# Patient Record
Sex: FEMALE | Race: WHITE | NOT HISPANIC OR LATINO | Employment: OTHER | URBAN - METROPOLITAN AREA
[De-identification: names, ages, dates, MRNs, and addresses within clinical notes are randomized per-mention and may not be internally consistent; named-entity substitution may affect disease eponyms.]

---

## 2018-06-09 ENCOUNTER — APPOINTMENT (EMERGENCY)
Dept: CT IMAGING | Facility: HOSPITAL | Age: 73
End: 2018-06-09
Payer: MEDICARE

## 2018-06-09 ENCOUNTER — HOSPITAL ENCOUNTER (EMERGENCY)
Facility: HOSPITAL | Age: 73
Discharge: HOME | End: 2018-06-09
Attending: EMERGENCY MEDICINE | Admitting: EMERGENCY MEDICINE
Payer: MEDICARE

## 2018-06-09 VITALS
HEART RATE: 72 BPM | SYSTOLIC BLOOD PRESSURE: 132 MMHG | WEIGHT: 167 LBS | OXYGEN SATURATION: 99 % | DIASTOLIC BLOOD PRESSURE: 70 MMHG | RESPIRATION RATE: 20 BRPM | HEIGHT: 66 IN | TEMPERATURE: 98.2 F | BODY MASS INDEX: 26.84 KG/M2

## 2018-06-09 DIAGNOSIS — R16.0 LIVER MASS: ICD-10-CM

## 2018-06-09 DIAGNOSIS — R91.8 LUNG MASS: Primary | ICD-10-CM

## 2018-06-09 LAB
ANION GAP BLD CALC-SCNC: 13 MMOL/L (ref 4–13)
BASE EXCESS BLDA CALC-SCNC: 5 MMOL/L (ref -2–3)
BUN BLD-MCNC: 20 MG/DL (ref 5–25)
CA-I BLD-SCNC: 1.17 MMOL/L (ref 1.12–1.32)
CHLORIDE BLD-SCNC: 106 MMOL/L (ref 100–108)
CREAT BLD-MCNC: 0.7 MG/DL (ref 0.6–1.3)
GFR SERPL CREATININE-BSD FRML MDRD: 85 ML/MIN/1.73SQ M
GLUCOSE SERPL-MCNC: 87 MG/DL (ref 65–140)
HCO3 BLDA-SCNC: 28.4 MMOL/L (ref 24–30)
HCT VFR BLD CALC: 40 % (ref 34.8–46.1)
HGB BLDA-MCNC: 13.6 G/DL (ref 11.5–15.4)
PCO2 BLD: 28 MMOL/L (ref 21–32)
PCO2 BLD: 30 MMOL/L (ref 21–32)
PCO2 BLD: 38.3 MM HG (ref 42–50)
PH BLD: 7.48 [PH] (ref 7.3–7.4)
PO2 BLD: 27 MM HG (ref 35–45)
POTASSIUM BLD-SCNC: 4.3 MMOL/L (ref 3.5–5.3)
SAO2 % BLD FROM PO2: 56 % (ref 95–98)
SODIUM BLD-SCNC: 142 MMOL/L (ref 136–145)
SPECIMEN SOURCE: ABNORMAL
SPECIMEN SOURCE: NORMAL

## 2018-06-09 PROCEDURE — 74177 CT ABD & PELVIS W/CONTRAST: CPT

## 2018-06-09 PROCEDURE — 99284 EMERGENCY DEPT VISIT MOD MDM: CPT

## 2018-06-09 PROCEDURE — 80047 BASIC METABLC PNL IONIZED CA: CPT

## 2018-06-09 PROCEDURE — 71260 CT THORAX DX C+: CPT

## 2018-06-09 PROCEDURE — 82803 BLOOD GASES ANY COMBINATION: CPT

## 2018-06-09 PROCEDURE — 85014 HEMATOCRIT: CPT

## 2018-06-09 RX ORDER — OLMESARTAN MEDOXOMIL 20 MG/1
20 TABLET ORAL DAILY
COMMUNITY

## 2018-06-09 RX ORDER — ACETAMINOPHEN 325 MG/1
650 TABLET ORAL ONCE
Status: COMPLETED | OUTPATIENT
Start: 2018-06-09 | End: 2018-06-09

## 2018-06-09 RX ORDER — PRAVASTATIN SODIUM 20 MG
20 TABLET ORAL DAILY
COMMUNITY

## 2018-06-09 RX ADMIN — ACETAMINOPHEN 650 MG: 325 TABLET, FILM COATED ORAL at 13:16

## 2018-06-09 RX ADMIN — IOHEXOL 100 ML: 350 INJECTION, SOLUTION INTRAVENOUS at 14:39

## 2018-06-09 NOTE — ED NOTES
All results reviewed with patient by MD  Pt to follow up with PCP regarding CT Findings       Savannah Kanner, RN  06/09/18 1219

## 2018-06-09 NOTE — ED PROVIDER NOTES
History  Chief Complaint   Patient presents with    Rib Injury     To ED with c/o pain in left rib and right shoulder after being tripped by her daughters dog  fell onto hardwood  Denies any LOC, head, neck or back injury  History provided by:  Patient   used: No        Patient is a 75-year-old female presenting to emergency department with left-sided thoracic rib pain  Nita Mendoza yesterday after dog ran into her  No head trauma  No loss of consciousness  No neck or back pain  No nausea or vomiting  No shortness of breath  No chest pain  No weakness numbness or tingling  MDM  CT chest abdomen pelvis to evaluate for rib fracture and spleen injury    Patient is not from the area  Prefers to follow up with oncologist at home  Patient will return tomorrow to  Cd with pictures  I spoke with radiology tech, they will make Cd available for her  Prior to Admission Medications   Prescriptions Last Dose Informant Patient Reported? Taking? olmesartan (BENICAR) 20 mg tablet  Self Yes Yes   Sig: Take 20 mg by mouth daily   pravastatin (PRAVACHOL) 20 mg tablet  Self Yes Yes   Sig: Take 20 mg by mouth daily      Facility-Administered Medications: None       Past Medical History:   Diagnosis Date    Hyperlipidemia     Hypertension        Past Surgical History:   Procedure Laterality Date    HYSTERECTOMY         History reviewed  No pertinent family history  I have reviewed and agree with the history as documented  Social History   Substance Use Topics    Smoking status: Never Smoker    Smokeless tobacco: Never Used    Alcohol use Yes      Comment: Social        Review of Systems   Constitutional: Negative for chills, diaphoresis and fever  HENT: Negative for congestion and sore throat  Respiratory: Negative for cough, shortness of breath, wheezing and stridor  Cardiovascular: Negative for chest pain, palpitations and leg swelling     Gastrointestinal: Positive for abdominal pain  Negative for blood in stool, diarrhea, nausea and vomiting  Genitourinary: Negative for dysuria, frequency and urgency  Musculoskeletal: Negative for neck pain and neck stiffness  Skin: Negative for pallor and rash  Neurological: Negative for dizziness, syncope, weakness, light-headedness and headaches  All other systems reviewed and are negative  Physical Exam  Physical Exam   Constitutional: She is oriented to person, place, and time  She appears well-developed and well-nourished  HENT:   Head: Normocephalic and atraumatic  Eyes: Pupils are equal, round, and reactive to light  Neck: Neck supple  Cardiovascular: Normal rate, regular rhythm, normal heart sounds and intact distal pulses  Pulmonary/Chest: Effort normal and breath sounds normal  No respiratory distress  Abdominal: Soft  Bowel sounds are normal  There is tenderness  Left upper quadrant abdominal tenderness   Musculoskeletal: Normal range of motion  She exhibits no edema or tenderness  Left thoracic rib tenderness   Neurological: She is alert and oriented to person, place, and time  Skin: Skin is warm and dry  Capillary refill takes less than 2 seconds  Vitals reviewed        Vital Signs  ED Triage Vitals [06/09/18 1256]   Temperature Pulse Respirations Blood Pressure SpO2   98 2 °F (36 8 °C) 80 20 157/69 100 %      Temp Source Heart Rate Source Patient Position - Orthostatic VS BP Location FiO2 (%)   Tympanic Monitor Sitting Right arm --      Pain Score       5           Vitals:    06/09/18 1256 06/09/18 1500   BP: 157/69 132/70   Pulse: 80 72   Patient Position - Orthostatic VS: Sitting Sitting       Visual Acuity  Visual Acuity      Most Recent Value   L Pupil Size (mm)  4   R Pupil Size (mm)  4          ED Medications  Medications   acetaminophen (TYLENOL) tablet 650 mg (650 mg Oral Given 6/9/18 1316)   iohexol (OMNIPAQUE) 350 MG/ML injection (MULTI-DOSE) 100 mL (100 mL Intravenous Given 6/9/18 1439) Diagnostic Studies  Results Reviewed     Procedure Component Value Units Date/Time    POCT Chem 8+ [[id number]] Collected:  06/09/18 1340    Lab Status:  Final result Updated:  06/09/18 1345     SODIUM, I-STAT 142 mmol/l      Potassium, i-STAT 4 3 mmol/L      Chloride, istat 106 mmol/L      CO2, i-STAT 28 mmol/L      Anion Gap, Istat 13 mmol/L      Calcium, Ionized i-STAT 1 17 mmol/L      BUN, I-STAT 20 mg/dl      Creatinine, i-STAT 0 7 mg/dl      eGFR 85 ml/min/1 73sq m      Glucose, i-STAT 87 mg/dl      Hct, i-STAT 40 %      Hgb, i-STAT 13 6 g/dl      Specimen Type VENOUS    POCT Blood Gas (G3+) [[id number]]  (Abnormal) Collected:  06/09/18 1327    Lab Status:  Final result Updated:  06/09/18 1332     ph, Osiel ISTAT 7 478 (HH)     pCO2, Osiel i-STAT 38 3 (L) mm HG      pO2, Osiel i-STAT 27 0 (L) mm HG      BE, i-STAT 5 (H) mmol/L      HCO3, Osiel i-STAT 28 4 mmol/L      CO2, i-STAT 30 mmol/L      O2 Sat, i-STAT 56 (L) %      Specimen Type VENOUS                 CT chest abdomen pelvis w contrast   Final Result by Kirstie Marks DO (06/09 1601)      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  Large lung mass within the posterior medial left lower lobe suspicious for malignancy  Septated hypodense mass within the posterior segment of the left lobe of the liver measuring approximately 3 3 cm may represent complex cyst   However, given the above described lung mass, hepatic metastasis not excluded        I personally discussed this study with BRE ARGUELLO on 6/9/2018 at 4:00 PM                         Workstation performed: [id number]                    Procedures  Procedures       Phone Contacts  ED Phone Contact    ED Course                               MDM  CritCare Time    Disposition  Final diagnoses:   Lung mass   Liver mass     Time reflects when diagnosis was documented in both MDM as applicable and the Disposition within this note     Time User Action Codes Description Comment 6/9/2018  4:18 PM Tadevosmichelle, Rebeca Add [R91 8] Lung mass     6/9/2018  4:18 PM TadevosRebeca martinez Add [R16 0] Liver mass       ED Disposition     ED Disposition Condition Comment    Discharge  Malgorzata Merino discharge to home/self care  Condition at discharge: Good        Follow-up Information     Follow up With Specialties Details Why Contact Info Additional Information    Lilli Holden MD  In 2 days  please follow-up with when you get home [address] [place] [id number] [phone]       [facility] Emergency Department Emergency Medicine  As needed, If symptoms worsen [address]  [address] [facility] [address], [address], [place], [place], [address] [facility]   please follow-up next week if you decide to stay in area [address]  [phone]           Discharge Medication List as of 6/9/2018  4:19 PM      CONTINUE these medications which have NOT CHANGED    Details   olmesartan (BENICAR) 20 mg tablet Take 20 mg by mouth daily, Historical Med      pravastatin (PRAVACHOL) 20 mg tablet Take 20 mg by mouth daily, Historical Med           No discharge procedures on file      ED Provider  Electronically Signed by           Terrie Yates MD  06/15/18 2012

## 2018-06-09 NOTE — DISCHARGE INSTRUCTIONS
Lung Cancer   WHAT YOU NEED TO KNOW:   What is lung cancer? Lung cancer usually starts in the cells that line the inside of the lungs  The 2 basic types of lung cancer are non-small cell lung cancer and small cell lung cancer  What increases my risk for lung cancer? · Cigarette smoking, or breathing secondhand smoke    · Air pollution, such as diesel exhaust fumes    · Exposure to radon gas    · A family history of lung cancer    · Past lung diseases that caused scarring in the lungs, such as tuberculosis (TB)    · Working with chemicals, such as asbestos, uranium, arsenic, chromium, nickel, iron, or radioactive material  What are the signs and symptoms of lung cancer? · Chest pain that is not just in one area of your chest    · A cough that will not go away, and gets worse over time    · Coughing up lots of sputum, which may be bloody    · Frequent colds or other respiratory infections, such as pneumonia or bronchitis    · Wheezing or trouble breathing    · Hoarseness or difficulty swallowing    · Feeling more tired and weak than usual    · Loss of appetite or weight loss without trying    · Face or neck swelling  How is lung cancer diagnosed? · Blood tests  may show an infection  · An x-ray, CT, or MRI  may show the size and location of the cancer, or signs of infection  You may be given contrast liquid to help your lungs show up better  Tell the healthcare provider if you have ever had an allergic reaction to contrast liquid  Do not enter the MRI room with anything metal  Metal can cause serious injury  Tell the healthcare provider if you have any metal in or on your body  · A bronchoscopy  is a test to look inside your airway and lungs  Healthcare providers insert a bronchoscope (tube with a light and magnifying glass on the end) into your mouth and down into your lungs  · A biopsy  is a sample of lung tissue usually collected during a bronchoscopy   The sample will be sent to a lab and checked for abnormal cells  How is lung cancer treated? · Surgery  may be done on tumors that are small and have not spread to other parts of the body  If the tumor cannot be completely removed, surgery may be used to treat complications or to decrease your symptoms  · Radiation therapy  uses x-rays or gamma rays to treat cancer  Radiation kills cancer cells and may stop the cancer from spreading  · Medicine  is used to kill cancer cells  It may also be used to shrink lymph nodes that have cancer in them  What can I do to manage my lung cancer? · Do not smoke  Nicotine and other chemicals in cigarettes and cigars can cause lung damage  Ask your healthcare provider for information if you currently smoke and need help to quit  E-cigarettes or smokeless tobacco still contain nicotine  Talk to your healthcare provider before you use these products  · Drink liquids as directed  Ask how much liquid to drink each day and which liquids are best for you  Drink extra liquids to prevent dehydration  You will need to drink extra liquids if you are vomiting or have diarrhea from cancer treatments  · Return to activities slowly  Do more as you feel stronger  You may have trouble breathing when you are lying down  Use foam wedges or elevate the head of your bed  This may help you breathe easier while you are resting or sleeping  Use a device that will tilt your whole body, or bend your body at the waist  The device should not bend your body at the upper back or neck  · Exercise as directed  Exercise can help increase your energy level  · Eat healthy foods  Healthy foods include fruits, vegetables, whole-grain breads, low-fat dairy products, beans, lean meats, and fish  It may be easier for you to eat several small meals a day rather than a few large meals  · Limit or do not drink alcohol as directed  Alcohol can make breathing problems worse   Ask your healthcare provider if alcohol is safe for you to drink  You will need to limit the amount you drink if it is safe for you  Men should limit alcohol to 2 drinks per day  Women should limit alcohol to 1 drink per day  A drink of alcohol is 12 ounces of beer, 5 ounces of wine, or 1½ ounces of liquor  Call 911 for any of the following:   · Your arm or leg feels warm, tender, and painful  It may look swollen and red  · You have chest pain when you take a deep breath or cough  · You suddenly feel lightheaded or are short of breath  · You cough up blood  When should I seek immediate care? · You cannot think clearly  · Your lips or nails look blue or pale  When should I contact my healthcare provider? · You have a fever  · You have blood in your mucus or spit  · You are vomiting and cannot keep food or liquids down  · You have questions or concerns about your condition or care  CARE AGREEMENT:   You have the right to help plan your care  Learn about your health condition and how it may be treated  Discuss treatment options with your caregivers to decide what care you want to receive  You always have the right to refuse treatment  The above information is an  only  It is not intended as medical advice for individual conditions or treatments  Talk to your doctor, nurse or pharmacist before following any medical regimen to see if it is safe and effective for you  © 2017 [address] [employer] Information is for End User's use only and may not be sold, redistributed or otherwise used for commercial purposes  All illustrations and images included in CareNotes® are the copyrighted property of [employer]  or [employer]

## 2021-05-05 ENCOUNTER — HOSPITAL ENCOUNTER (OUTPATIENT)
Dept: GENERAL RADIOLOGY | Age: 76
Discharge: HOME | End: 2021-05-05
Attending: NURSE PRACTITIONER
Payer: MEDICARE

## 2021-05-05 ENCOUNTER — TRANSCRIBE ORDER (OUTPATIENT)
Dept: GENERAL RADIOLOGY | Age: 76
End: 2021-05-05

## 2021-05-05 DIAGNOSIS — M47.814 THORACIC SPONDYLOSIS WITHOUT MYELOPATHY: ICD-10-CM

## 2021-05-05 DIAGNOSIS — M47.814 THORACIC SPONDYLOSIS WITHOUT MYELOPATHY: Primary | ICD-10-CM

## 2021-05-05 PROCEDURE — 72072 X-RAY EXAM THORAC SPINE 3VWS: CPT

## 2022-09-16 ENCOUNTER — TRANSCRIBE ORDER (OUTPATIENT)
Dept: SCHEDULING | Age: 77
End: 2022-09-16

## 2022-09-16 DIAGNOSIS — R10.9 STOMACH ACHE: Primary | ICD-10-CM

## 2022-09-16 DIAGNOSIS — Q89.3 SITUS INVERSUS: ICD-10-CM

## 2023-12-29 ENCOUNTER — TRANSCRIBE ORDERS (OUTPATIENT)
Facility: HOSPITAL | Age: 78
End: 2023-12-29

## 2023-12-29 DIAGNOSIS — M79.89 SWELLING OF LIMB: Primary | ICD-10-CM

## 2023-12-30 ENCOUNTER — HOSPITAL ENCOUNTER (OUTPATIENT)
Dept: VASCULAR SURGERY | Facility: HOSPITAL | Age: 78
End: 2023-12-30
Attending: FAMILY MEDICINE
Payer: MEDICARE

## 2023-12-30 DIAGNOSIS — M79.89 SWELLING OF LIMB: ICD-10-CM

## 2023-12-30 PROCEDURE — 93970 EXTREMITY STUDY: CPT

## 2024-11-05 ENCOUNTER — TELEPHONE (OUTPATIENT)
Age: 79
End: 2024-11-05

## 2024-11-05 NOTE — TELEPHONE ENCOUNTER
Referral received from PCP Ashley Mcguire to be seen for Short term memory loss. I left a message to call back if she'd like to schedule.

## 2024-12-18 ENCOUNTER — OFFICE VISIT (OUTPATIENT)
Age: 79
End: 2024-12-18
Payer: MEDICARE

## 2024-12-18 VITALS
TEMPERATURE: 98.2 F | OXYGEN SATURATION: 91 % | DIASTOLIC BLOOD PRESSURE: 71 MMHG | HEART RATE: 95 BPM | SYSTOLIC BLOOD PRESSURE: 146 MMHG | WEIGHT: 144 LBS | RESPIRATION RATE: 16 BRPM

## 2024-12-18 DIAGNOSIS — R41.0 CONFUSION: ICD-10-CM

## 2024-12-18 DIAGNOSIS — R41.3 MEMORY DIFFICULTIES: Primary | ICD-10-CM

## 2024-12-18 PROBLEM — H90.A21 SENSORINEURAL HEARING LOSS (SNHL) OF RIGHT EAR WITH RESTRICTED HEARING OF LEFT EAR: Status: RESOLVED | Noted: 2017-03-23 | Resolved: 2024-12-18

## 2024-12-18 PROBLEM — U07.1 COVID-19: Status: RESOLVED | Noted: 2023-11-29 | Resolved: 2024-12-18

## 2024-12-18 PROBLEM — R06.09 DYSPNEA ON EXERTION: Status: RESOLVED | Noted: 2022-09-02 | Resolved: 2024-12-18

## 2024-12-18 PROBLEM — E53.8 VITAMIN B 12 DEFICIENCY: Status: RESOLVED | Noted: 2017-03-22 | Resolved: 2024-12-18

## 2024-12-18 PROBLEM — Z80.41 FAMILY HISTORY OF OVARIAN CANCER: Status: RESOLVED | Noted: 2017-07-06 | Resolved: 2024-12-18

## 2024-12-18 PROBLEM — R53.1 ASTHENIA: Status: RESOLVED | Noted: 2022-05-16 | Resolved: 2024-12-18

## 2024-12-18 PROBLEM — N18.31 CHRONIC KIDNEY DISEASE, STAGE 3A (HCC): Status: RESOLVED | Noted: 2024-12-18 | Resolved: 2024-12-18

## 2024-12-18 PROBLEM — K21.9 GERD WITHOUT ESOPHAGITIS: Status: RESOLVED | Noted: 2017-04-18 | Resolved: 2024-12-18

## 2024-12-18 PROBLEM — I10 ESSENTIAL (PRIMARY) HYPERTENSION: Status: RESOLVED | Noted: 2024-12-18 | Resolved: 2024-12-18

## 2024-12-18 PROBLEM — C83.33 DIFFUSE LARGE B-CELL LYMPHOMA OF INTRA-ABDOMINAL LYMPH NODES (HCC): Status: RESOLVED | Noted: 2022-08-15 | Resolved: 2024-12-18

## 2024-12-18 PROBLEM — Z96.642 HISTORY OF LEFT HIP REPLACEMENT: Status: RESOLVED | Noted: 2020-07-27 | Resolved: 2024-12-18

## 2024-12-18 PROBLEM — S22.000A COMPRESSION FRACTURE OF THORACIC VERTEBRA (HCC): Status: RESOLVED | Noted: 2020-10-13 | Resolved: 2024-12-18

## 2024-12-18 PROBLEM — K57.92 DIVERTICULITIS: Status: RESOLVED | Noted: 2019-07-24 | Resolved: 2024-12-18

## 2024-12-18 PROBLEM — Z86.19 HISTORY OF SEPSIS: Status: RESOLVED | Noted: 2020-07-21 | Resolved: 2024-12-18

## 2024-12-18 PROBLEM — I27.20 PULMONARY HYPERTENSION (HCC): Status: RESOLVED | Noted: 2021-06-10 | Resolved: 2024-12-18

## 2024-12-18 PROBLEM — R00.2 PALPITATIONS: Status: RESOLVED | Noted: 2022-11-15 | Resolved: 2024-12-18

## 2024-12-18 PROBLEM — D64.9 ANEMIA: Status: RESOLVED | Noted: 2021-12-30 | Resolved: 2024-12-18

## 2024-12-18 PROBLEM — D50.1 IRON DEFICIENCY ANEMIA DUE TO SIDEROPENIC DYSPHAGIA: Status: RESOLVED | Noted: 2017-03-22 | Resolved: 2024-12-18

## 2024-12-18 PROBLEM — G89.4 CHRONIC PAIN DISORDER: Status: RESOLVED | Noted: 2021-01-28 | Resolved: 2024-12-18

## 2024-12-18 PROBLEM — G62.9 NEUROPATHY: Status: RESOLVED | Noted: 2024-12-18 | Resolved: 2024-12-18

## 2024-12-18 PROBLEM — R00.0 SINUS TACHYCARDIA: Status: RESOLVED | Noted: 2022-11-15 | Resolved: 2024-12-18

## 2024-12-18 PROBLEM — C54.1 MALIGNANT NEOPLASM OF ENDOMETRIUM OF CORPUS UTERI (HCC): Status: RESOLVED | Noted: 2024-12-18 | Resolved: 2024-12-18

## 2024-12-18 PROBLEM — I26.99 PULMONARY EMBOLISM (HCC): Status: RESOLVED | Noted: 2024-12-18 | Resolved: 2024-12-18

## 2024-12-18 PROBLEM — D69.6 THROMBOCYTOPENIA (HCC): Status: RESOLVED | Noted: 2022-01-08 | Resolved: 2024-12-18

## 2024-12-18 PROBLEM — S22.49XA FRACTURE OF MULTIPLE RIBS: Status: RESOLVED | Noted: 2021-02-04 | Resolved: 2024-12-18

## 2024-12-18 PROBLEM — K57.90 DIVERTICULOSIS: Status: RESOLVED | Noted: 2019-07-24 | Resolved: 2024-12-18

## 2024-12-18 PROBLEM — Z87.440 HISTORY OF RECURRENT UTIS: Status: RESOLVED | Noted: 2017-04-18 | Resolved: 2024-12-18

## 2024-12-18 PROBLEM — I95.9 HYPOTENSIVE EPISODE: Status: RESOLVED | Noted: 2022-05-16 | Resolved: 2024-12-18

## 2024-12-18 PROBLEM — M47.814 THORACIC SPONDYLOSIS WITHOUT MYELOPATHY: Status: RESOLVED | Noted: 2021-01-28 | Resolved: 2024-12-18

## 2024-12-18 PROCEDURE — 99204 OFFICE O/P NEW MOD 45 MIN: CPT | Performed by: PSYCHIATRY & NEUROLOGY

## 2024-12-18 PROCEDURE — G8484 FLU IMMUNIZE NO ADMIN: HCPCS | Performed by: PSYCHIATRY & NEUROLOGY

## 2024-12-18 PROCEDURE — G8399 PT W/DXA RESULTS DOCUMENT: HCPCS | Performed by: PSYCHIATRY & NEUROLOGY

## 2024-12-18 PROCEDURE — 1159F MED LIST DOCD IN RCRD: CPT | Performed by: PSYCHIATRY & NEUROLOGY

## 2024-12-18 PROCEDURE — G8427 DOCREV CUR MEDS BY ELIG CLIN: HCPCS | Performed by: PSYCHIATRY & NEUROLOGY

## 2024-12-18 PROCEDURE — 1090F PRES/ABSN URINE INCON ASSESS: CPT | Performed by: PSYCHIATRY & NEUROLOGY

## 2024-12-18 PROCEDURE — 1123F ACP DISCUSS/DSCN MKR DOCD: CPT | Performed by: PSYCHIATRY & NEUROLOGY

## 2024-12-18 PROCEDURE — 96138 PSYCL/NRPSYC TECH 1ST: CPT | Performed by: PSYCHIATRY & NEUROLOGY

## 2024-12-18 PROCEDURE — 1036F TOBACCO NON-USER: CPT | Performed by: PSYCHIATRY & NEUROLOGY

## 2024-12-18 PROCEDURE — G8421 BMI NOT CALCULATED: HCPCS | Performed by: PSYCHIATRY & NEUROLOGY

## 2024-12-18 RX ORDER — CARBOXYMETHYLCELLULOSE SODIUM 5 MG/ML
1 SOLUTION/ DROPS OPHTHALMIC
COMMUNITY

## 2024-12-18 RX ORDER — FLUTICASONE FUROATE, UMECLIDINIUM BROMIDE AND VILANTEROL TRIFENATATE 100; 62.5; 25 UG/1; UG/1; UG/1
1 POWDER RESPIRATORY (INHALATION) DAILY
COMMUNITY
Start: 2024-10-17

## 2024-12-18 RX ORDER — HYDROCORTISONE 10 MG/1
10 TABLET ORAL 2 TIMES DAILY
COMMUNITY

## 2024-12-18 RX ORDER — ALBUTEROL SULFATE 90 UG/1
2 INHALANT RESPIRATORY (INHALATION) EVERY 6 HOURS PRN
COMMUNITY
Start: 2024-12-10

## 2024-12-18 RX ORDER — METOPROLOL TARTRATE 50 MG
50 TABLET ORAL 2 TIMES DAILY
COMMUNITY

## 2024-12-18 RX ORDER — BRIMONIDINE TARTRATE 2 MG/ML
1 SOLUTION/ DROPS OPHTHALMIC 2 TIMES DAILY
COMMUNITY

## 2024-12-18 RX ORDER — RIVAROXABAN 20 MG/1
20 TABLET, FILM COATED ORAL
COMMUNITY

## 2024-12-18 RX ORDER — TRAVOPROST OPHTHALMIC SOLUTION 0.04 MG/ML
1 SOLUTION OPHTHALMIC NIGHTLY
COMMUNITY
End: 2024-12-18

## 2024-12-18 ASSESSMENT — PATIENT HEALTH QUESTIONNAIRE - PHQ9
1. LITTLE INTEREST OR PLEASURE IN DOING THINGS: NOT AT ALL
1. LITTLE INTEREST OR PLEASURE IN DOING THINGS: NOT AT ALL
SUM OF ALL RESPONSES TO PHQ9 QUESTIONS 1 & 2: 0
SUM OF ALL RESPONSES TO PHQ9 QUESTIONS 1 & 2: 0
SUM OF ALL RESPONSES TO PHQ QUESTIONS 1-9: 0
2. FEELING DOWN, DEPRESSED OR HOPELESS: NOT AT ALL
SUM OF ALL RESPONSES TO PHQ QUESTIONS 1-9: 0
SUM OF ALL RESPONSES TO PHQ QUESTIONS 1-9: 0
2. FEELING DOWN, DEPRESSED OR HOPELESS: NOT AT ALL
SUM OF ALL RESPONSES TO PHQ QUESTIONS 1-9: 0

## 2024-12-18 NOTE — PROGRESS NOTES
[address] (16 minute minimum)  20 minutes were spent administering cognitive testing by medical assistant/nurse.     Cognitive Testing Evaluation     Introduction:     Anastacia Oliver  1945  Female   This 79 year old Female was administered a battery of neurocognitive testing on 12/18/2024.     Tests Administered:     Trails A, Trails B, Digit Symbol Substitution, Stroop, Immediate Recognition, Delayed Recognition   The combined test administration time was not recorded   Test Results:   Cognitive testing was provided via a battery of cognitive assessments. The pattern of test scores indicate that results are valid.  A Clinical Report with further description of scores and results is also available.   Overall: Patient tested in the 1st* percentile (standard score of 0).   Trails A: Patient tested in the --* percentile (scaled standard score of null).  Trails B: Patient tested in the --* percentile (scaled standard score of null).  Digit Symbol Substitution: Patient tested in the 1st percentile (scaled standard score of -6).  Stroop: Patient tested in the 1st percentile (scaled standard score of -16).  Immediate Recognition: Patient tested in the 1st percentile (scaled standard score of -22).  Delayed Recognition: Patient tested in the 1st percentile (scaled standard score of 45).   * These assessments were not scored because they were potentially invalid, or the patient failed to complete in the allotted time.   Interpretation of Test Scores:     Examination of individual component tests shows:   Attention - Trails A: possible impairment  Mental Flexibility - Trails B: possible impairment  Executive Function - Digit Symbol Substitution: likely impairment  Executive Function - Stroop: likely impairment  Memory - Immediate Recognition: likely impairment  Memory - Delayed Recognition: likely impairment    The patient’s overall cognitive test performance was in the 1st percentile when compared to individuals of a

## 2024-12-18 NOTE — PATIENT INSTRUCTIONS
PRIOR AUTHORIZATION FOR MEDICATIONS    If you were prescribed medication during your visit, it may require a prior authorization which is a determination of the medication coverage made by your insurance plan.     This process can take 14 business days for most medications prescribed by our Neurologists.      Botox injections (for therapeutic use) can take up to 8 weeks.    If you haven't heard from our office or your pharmacy within the time outlined above, please contact our office.        [place] [place] [facility] Test Result Communication    Test results are available in [employer].  [employer] is the patient portal into our electronic health record.  This feature allows patients to see diagnostic test results, immunizations, allergies, past medical and surgical history, current medications, and send messages directly to providers.  Our team members at the  can provide additional information and assist with registration.  The [employer] support team can be reached at [phone].    In some cases, a provider might need time to explain the results in detail during a follow-up appointment.  This might include additional information or context that will help patients understand the reason for next steps in the plan of care recommended by their provider.    If a patient chooses to receive diagnostic testing at an imaging center outside of the [facility] network, it is the patient's responsibility to bring the imaging report and disc to their [facility] follow-up appointment.    If the test results reveal anything that is particularly noteworthy, we will contact you to discuss the matter and, if necessary, schedule a follow-up appointment at an earlier date.    If you have not received your test results by [employer] or other communication within 7 days, please contact our office.  An inquiry can be sent to your provider using [employer].  Alternatively, appointments can be scheduled via

## 2024-12-18 NOTE — PROGRESS NOTES
[facility] at [facility] at [address] [place] [address]  [address] [place] [address] [phone] Office  [phone] Facsimile           Referring: Ashley Mcguire MD  [address] [place] [address]     Chief Complaint   Patient presents with    New Patient     Presents with Page daughter today    Memory Loss     Periods of confusion a little over a year, including not recognizing spouse, her home, etc.  Becoming more consistent        History of Present Illness  The patient is an 79-year-old female presenting today for a neurologic consultation regarding worsening memory. She is accompanied by her daughter.    The patient reports experiencing increased difficulty in performing tasks that were previously manageable, indicative of a decline in cognitive function. She occasionally repeats questions or stories but does not forget conversations. She has been abstaining from driving for approximately 3 years without any associated issues. Her  manages their financial matters, a responsibility they previously shared. Her mood remains stable, with no feelings of depression. Her daughter has observed periods of confusion, which have become more prolonged and consistent over time. These episodes are characterized by disorientation, such as not recognizing her  or their home. She has not undergone any brain imaging or formal cognitive testing. She does not smoke or drink alcohol. Prior to custodial, she worked as an  and was known for her organizational skills. Currently, she shares household responsibilities with her  and has external help for cleaning. She does not snore.She does not smoke. She is not a big drinker. She worked as an . Her mother had dementia that started in her

## 2024-12-27 ENCOUNTER — TELEPHONE (OUTPATIENT)
Age: 79
End: 2024-12-27

## 2024-12-27 DIAGNOSIS — R41.3 MEMORY DIFFICULTIES: ICD-10-CM

## 2024-12-27 DIAGNOSIS — R41.0 CONFUSION: ICD-10-CM

## 2024-12-27 LAB
T4 FREE SERPL-MCNC: 1.9 NG/DL (ref 0.8–1.5)
TSH SERPL DL<=0.05 MIU/L-ACNC: 0.53 UIU/ML (ref 0.36–3.74)
VIT B12 SERPL-MCNC: 925 PG/ML (ref 193–986)

## 2024-12-27 NOTE — TELEPHONE ENCOUNTER
Patient's daughter is requesting a call to schedule a f/u appt with .    Would also like to speak to the nurse regarding pt's condition as it is worsening. States she has started to wander off and is concerned.    Please contact to discuss.

## 2024-12-27 NOTE — TELEPHONE ENCOUNTER
Pt just seen 12/18/24 as a new pt for memory- has upcoming appt's for dop, eeg, MRI, neurocog, ect  Per daughter, pt is having worsening night time sundowning sx's. Wandering, hallucinations \"thought  was a stranger\". Pt lives w/ .   Would like to know if there is anything we would be willing to rx for pt at this point to help at night especially.  Recommend acute eval for UTI or other infection- daughter states they saw PCP within the last week and ruled out UTI. States PCP has \"passed the buck\" to neurology as far as prescribing anything for her mom.   Advised daughter that Dr Marte is out of the office. I can message the on call provider, but daughter does understand  testing may need to be completed first.  States she did not get a purple memory packet of resources at her visit- one was placed up front and she will come pick that up.

## 2024-12-28 ENCOUNTER — APPOINTMENT (OUTPATIENT)
Facility: HOSPITAL | Age: 79
DRG: 689 | End: 2024-12-28
Payer: MEDICARE

## 2024-12-28 ENCOUNTER — HOSPITAL ENCOUNTER (INPATIENT)
Facility: HOSPITAL | Age: 79
LOS: 3 days | Discharge: HOME HEALTH | DRG: 689 | End: 2024-12-31
Attending: EMERGENCY MEDICINE | Admitting: INTERNAL MEDICINE
Payer: MEDICARE

## 2024-12-28 DIAGNOSIS — N30.00 ACUTE CYSTITIS WITHOUT HEMATURIA: ICD-10-CM

## 2024-12-28 DIAGNOSIS — G93.40 ACUTE ENCEPHALOPATHY: Primary | ICD-10-CM

## 2024-12-28 DIAGNOSIS — I63.9 CEREBROVASCULAR ACCIDENT (CVA), UNSPECIFIED MECHANISM (HCC): ICD-10-CM

## 2024-12-28 PROBLEM — R41.82 AMS (ALTERED MENTAL STATUS): Status: ACTIVE | Noted: 2024-12-28

## 2024-12-28 LAB
ALBUMIN SERPL-MCNC: 3 G/DL (ref 3.5–5)
ALBUMIN/GLOB SERPL: 0.7 (ref 1.1–2.2)
ALP SERPL-CCNC: 66 U/L (ref 45–117)
ALT SERPL-CCNC: 14 U/L (ref 12–78)
AMPHET UR QL SCN: NEGATIVE
ANION GAP SERPL CALC-SCNC: 4 MMOL/L (ref 2–12)
APPEARANCE UR: CLEAR
AST SERPL-CCNC: 24 U/L (ref 15–37)
BACTERIA URNS QL MICRO: NEGATIVE /HPF
BARBITURATES UR QL SCN: NEGATIVE
BASOPHILS # BLD: 0 K/UL (ref 0–0.1)
BASOPHILS NFR BLD: 0 % (ref 0–1)
BENZODIAZ UR QL: NEGATIVE
BILIRUB SERPL-MCNC: 1 MG/DL (ref 0.2–1)
BILIRUB UR QL: NEGATIVE
BUN SERPL-MCNC: 13 MG/DL (ref 6–20)
BUN/CREAT SERPL: 14 (ref 12–20)
CALCIUM SERPL-MCNC: 8.6 MG/DL (ref 8.5–10.1)
CANNABINOIDS UR QL SCN: NEGATIVE
CHLORIDE SERPL-SCNC: 108 MMOL/L (ref 97–108)
CO2 SERPL-SCNC: 27 MMOL/L (ref 21–32)
COCAINE UR QL SCN: NEGATIVE
COLOR UR: ABNORMAL
COMMENT:: NORMAL
CREAT SERPL-MCNC: 0.93 MG/DL (ref 0.55–1.02)
DIFFERENTIAL METHOD BLD: ABNORMAL
EOSINOPHIL # BLD: 0 K/UL (ref 0–0.4)
EOSINOPHIL NFR BLD: 0 % (ref 0–7)
EPITH CASTS URNS QL MICRO: ABNORMAL /LPF
ERYTHROCYTE [DISTWIDTH] IN BLOOD BY AUTOMATED COUNT: 13.3 % (ref 11.5–14.5)
ETHANOL SERPL-MCNC: <10 MG/DL (ref 0–0.08)
FOLATE SERPL-MCNC: 10.5 NG/ML (ref 5–21)
GLOBULIN SER CALC-MCNC: 4.1 G/DL (ref 2–4)
GLUCOSE BLD STRIP.AUTO-MCNC: 91 MG/DL (ref 65–117)
GLUCOSE SERPL-MCNC: 109 MG/DL (ref 65–100)
GLUCOSE UR STRIP.AUTO-MCNC: NEGATIVE MG/DL
HCT VFR BLD AUTO: 44.2 % (ref 35–47)
HGB BLD-MCNC: 14.2 G/DL (ref 11.5–16)
HGB UR QL STRIP: NEGATIVE
IMM GRANULOCYTES # BLD AUTO: 0.1 K/UL (ref 0–0.04)
IMM GRANULOCYTES NFR BLD AUTO: 1 % (ref 0–0.5)
KETONES UR QL STRIP.AUTO: 40 MG/DL
LEUKOCYTE ESTERASE UR QL STRIP.AUTO: ABNORMAL
LYMPHOCYTES # BLD: 0.9 K/UL (ref 0.8–3.5)
LYMPHOCYTES NFR BLD: 10 % (ref 12–49)
Lab: NORMAL
MAGNESIUM SERPL-MCNC: 1.9 MG/DL (ref 1.6–2.4)
MCH RBC QN AUTO: 28.2 PG (ref 26–34)
MCHC RBC AUTO-ENTMCNC: 32.1 G/DL (ref 30–36.5)
MCV RBC AUTO: 87.9 FL (ref 80–99)
METHADONE UR QL: NEGATIVE
MONOCYTES # BLD: 0.8 K/UL (ref 0–1)
MONOCYTES NFR BLD: 9 % (ref 5–13)
NEUTS SEG # BLD: 6.9 K/UL (ref 1.8–8)
NEUTS SEG NFR BLD: 80 % (ref 32–75)
NITRITE UR QL STRIP.AUTO: NEGATIVE
NRBC # BLD: 0 K/UL (ref 0–0.01)
NRBC BLD-RTO: 0 PER 100 WBC
OPIATES UR QL: NEGATIVE
PCP UR QL: NEGATIVE
PH UR STRIP: 6.5 (ref 5–8)
PLATELET # BLD AUTO: 277 K/UL (ref 150–400)
PMV BLD AUTO: 9.4 FL (ref 8.9–12.9)
POTASSIUM SERPL-SCNC: 3.4 MMOL/L (ref 3.5–5.1)
PROT SERPL-MCNC: 7.1 G/DL (ref 6.4–8.2)
PROT UR STRIP-MCNC: 30 MG/DL
RBC # BLD AUTO: 5.03 M/UL (ref 3.8–5.2)
RBC #/AREA URNS HPF: ABNORMAL /HPF (ref 0–5)
SERVICE CMNT-IMP: NORMAL
SODIUM SERPL-SCNC: 139 MMOL/L (ref 136–145)
SP GR UR REFRACTOMETRY: 1.02 (ref 1–1.03)
SPECIMEN HOLD: NORMAL
TSH SERPL DL<=0.05 MIU/L-ACNC: 0.79 UIU/ML (ref 0.36–3.74)
UROBILINOGEN UR QL STRIP.AUTO: 1 EU/DL (ref 0.2–1)
VIT B12 SERPL-MCNC: 1072 PG/ML (ref 193–986)
WBC # BLD AUTO: 8.6 K/UL (ref 3.6–11)
WBC URNS QL MICRO: ABNORMAL /HPF (ref 0–4)

## 2024-12-28 PROCEDURE — 96374 THER/PROPH/DIAG INJ IV PUSH: CPT

## 2024-12-28 PROCEDURE — 80053 COMPREHEN METABOLIC PANEL: CPT

## 2024-12-28 PROCEDURE — 99285 EMERGENCY DEPT VISIT HI MDM: CPT

## 2024-12-28 PROCEDURE — 94761 N-INVAS EAR/PLS OXIMETRY MLT: CPT

## 2024-12-28 PROCEDURE — 80307 DRUG TEST PRSMV CHEM ANLYZR: CPT

## 2024-12-28 PROCEDURE — 84443 ASSAY THYROID STIM HORMONE: CPT

## 2024-12-28 PROCEDURE — 6360000002 HC RX W HCPCS: Performed by: EMERGENCY MEDICINE

## 2024-12-28 PROCEDURE — 87186 SC STD MICRODIL/AGAR DIL: CPT

## 2024-12-28 PROCEDURE — 1100000000 HC RM PRIVATE

## 2024-12-28 PROCEDURE — 82746 ASSAY OF FOLIC ACID SERUM: CPT

## 2024-12-28 PROCEDURE — 70450 CT HEAD/BRAIN W/O DYE: CPT

## 2024-12-28 PROCEDURE — 2500000003 HC RX 250 WO HCPCS: Performed by: EMERGENCY MEDICINE

## 2024-12-28 PROCEDURE — 93005 ELECTROCARDIOGRAM TRACING: CPT | Performed by: EMERGENCY MEDICINE

## 2024-12-28 PROCEDURE — 85025 COMPLETE CBC W/AUTO DIFF WBC: CPT

## 2024-12-28 PROCEDURE — 6370000000 HC RX 637 (ALT 250 FOR IP): Performed by: INTERNAL MEDICINE

## 2024-12-28 PROCEDURE — 36415 COLL VENOUS BLD VENIPUNCTURE: CPT

## 2024-12-28 PROCEDURE — 82962 GLUCOSE BLOOD TEST: CPT

## 2024-12-28 PROCEDURE — 87086 URINE CULTURE/COLONY COUNT: CPT

## 2024-12-28 PROCEDURE — 82077 ASSAY SPEC XCP UR&BREATH IA: CPT

## 2024-12-28 PROCEDURE — 6370000000 HC RX 637 (ALT 250 FOR IP): Performed by: EMERGENCY MEDICINE

## 2024-12-28 PROCEDURE — 82607 VITAMIN B-12: CPT

## 2024-12-28 PROCEDURE — 81001 URINALYSIS AUTO W/SCOPE: CPT

## 2024-12-28 PROCEDURE — 83735 ASSAY OF MAGNESIUM: CPT

## 2024-12-28 PROCEDURE — 2500000003 HC RX 250 WO HCPCS: Performed by: INTERNAL MEDICINE

## 2024-12-28 PROCEDURE — 87088 URINE BACTERIA CULTURE: CPT

## 2024-12-28 RX ORDER — SODIUM CHLORIDE 0.9 % (FLUSH) 0.9 %
5-40 SYRINGE (ML) INJECTION EVERY 12 HOURS SCHEDULED
Status: DISCONTINUED | OUTPATIENT
Start: 2024-12-28 | End: 2024-12-31 | Stop reason: HOSPADM

## 2024-12-28 RX ORDER — METOPROLOL TARTRATE 50 MG
50 TABLET ORAL 2 TIMES DAILY
Status: DISCONTINUED | OUTPATIENT
Start: 2024-12-28 | End: 2024-12-31 | Stop reason: HOSPADM

## 2024-12-28 RX ORDER — IPRATROPIUM BROMIDE AND ALBUTEROL SULFATE 2.5; .5 MG/3ML; MG/3ML
1 SOLUTION RESPIRATORY (INHALATION) EVERY 4 HOURS PRN
Status: DISCONTINUED | OUTPATIENT
Start: 2024-12-28 | End: 2024-12-31 | Stop reason: HOSPADM

## 2024-12-28 RX ORDER — SODIUM CHLORIDE 0.9 % (FLUSH) 0.9 %
5-40 SYRINGE (ML) INJECTION PRN
Status: DISCONTINUED | OUTPATIENT
Start: 2024-12-28 | End: 2024-12-31 | Stop reason: HOSPADM

## 2024-12-28 RX ORDER — SODIUM CHLORIDE 9 MG/ML
INJECTION, SOLUTION INTRAVENOUS PRN
Status: DISCONTINUED | OUTPATIENT
Start: 2024-12-28 | End: 2024-12-31 | Stop reason: HOSPADM

## 2024-12-28 RX ORDER — HYDROCORTISONE 10 MG/1
10 TABLET ORAL 2 TIMES DAILY
Status: DISCONTINUED | OUTPATIENT
Start: 2024-12-28 | End: 2024-12-31 | Stop reason: HOSPADM

## 2024-12-28 RX ADMIN — METOPROLOL TARTRATE 50 MG: 50 TABLET, FILM COATED ORAL at 22:07

## 2024-12-28 RX ADMIN — SODIUM CHLORIDE, PRESERVATIVE FREE 10 ML: 5 INJECTION INTRAVENOUS at 22:05

## 2024-12-28 RX ADMIN — WATER 1000 MG: 1 INJECTION INTRAMUSCULAR; INTRAVENOUS; SUBCUTANEOUS at 17:03

## 2024-12-28 RX ADMIN — HYDROCORTISONE 10 MG: 10 TABLET ORAL at 22:05

## 2024-12-28 RX ADMIN — POTASSIUM BICARBONATE 40 MEQ: 782 TABLET, EFFERVESCENT ORAL at 15:17

## 2024-12-28 ASSESSMENT — PAIN - FUNCTIONAL ASSESSMENT: PAIN_FUNCTIONAL_ASSESSMENT: NONE - DENIES PAIN

## 2024-12-28 NOTE — ED TRIAGE NOTES
Pt arrives with family for intermittent confusion for last year. States worse in last months and saw Dr Marte and is having some testing for Dementia dx. States that she has declined over last few weeks and now pt is hallucinating and confused but worse at night.

## 2024-12-28 NOTE — ED PROVIDER NOTES
change.  There is a mild degree of cerebral atrophy. [RS]   1500 Potassium(!): 3.4  CMP otherwise reassuring. Unlikely this is cause of her sx [RS]   1500 WBC: 8.6 [RS]   1500 TSH, 3rd Generation: 0.79 [RS]   1500 Ethanol Lvl: <10 [RS]   1610 WBC, UA: 20-50 [RS]   1610 Ketones, Urine(!): 40 [RS]   1610 Bacteria, UA: Negative [RS]      ED Course User Index  [RS] Raza Brown MD           CONSULTS:  IP CONSULT TO NEUROLOGY    PROCEDURES:  Unless otherwise noted below, none     Procedures    MEDS:  Medications   sodium chloride flush 0.9 % injection 5-40 mL (10 mLs IntraVENous Given 12/28/24 2205)   sodium chloride flush 0.9 % injection 5-40 mL (has no administration in time range)   0.9 % sodium chloride infusion (has no administration in time range)   cefTRIAXone (ROCEPHIN) 1,000 mg in sterile water 10 mL IV syringe (has no administration in time range)   ipratropium 0.5 mg-albuterol 2.5 mg (DUONEB) nebulizer solution 1 Dose (has no administration in time range)   rivaroxaban (XARELTO) tablet 20 mg (has no administration in time range)   hydrocortisone (CORTEF) tablet 10 mg (10 mg Oral Given 12/28/24 2205)   metoprolol tartrate (LOPRESSOR) tablet 50 mg (50 mg Oral Given 12/28/24 2207)   potassium bicarb-citric acid (EFFER-K) effervescent tablet 40 mEq (40 mEq Oral Given 12/28/24 1517)   cefTRIAXone (ROCEPHIN) 1,000 mg in sterile water 10 mL IV syringe (1,000 mg IntraVENous Given 12/28/24 1703)       Perfect Serve Consult for Admission    ED Room Number: 421/01  Patient Name and age:  Anastacia Oliver 79 y.o.  female  Working Diagnosis:   1. Acute encephalopathy    2. Acute cystitis without hematuria    3. Cerebrovascular accident (CVA), unspecified mechanism (HCC)        COVID-19 Suspicion: No  Sepsis present:  No  Reassessment needed: No  Code Status:  Full Code  Readmission: No  Isolation Requirements: no  Recommended Level of Care: telemetry  Department: [place] [facility] - [phone]  Consulting Provider:

## 2024-12-29 ENCOUNTER — APPOINTMENT (OUTPATIENT)
Facility: HOSPITAL | Age: 79
DRG: 689 | End: 2024-12-29
Payer: MEDICARE

## 2024-12-29 ENCOUNTER — APPOINTMENT (OUTPATIENT)
Dept: VASCULAR SURGERY | Facility: HOSPITAL | Age: 79
DRG: 689 | End: 2024-12-29
Attending: INTERNAL MEDICINE
Payer: MEDICARE

## 2024-12-29 PROBLEM — N39.0 UTI (URINARY TRACT INFECTION): Status: ACTIVE | Noted: 2024-12-29

## 2024-12-29 PROBLEM — R41.3 MEMORY IMPAIRMENT: Status: ACTIVE | Noted: 2024-12-29

## 2024-12-29 PROBLEM — J44.9 COPD (CHRONIC OBSTRUCTIVE PULMONARY DISEASE) (HCC): Status: ACTIVE | Noted: 2024-12-29

## 2024-12-29 PROBLEM — G93.40 ACUTE ENCEPHALOPATHY: Status: ACTIVE | Noted: 2024-12-29

## 2024-12-29 PROBLEM — I10 HTN (HYPERTENSION), BENIGN: Status: ACTIVE | Noted: 2024-12-18

## 2024-12-29 PROBLEM — E27.40: Status: ACTIVE | Noted: 2024-12-29

## 2024-12-29 PROBLEM — Z86.711 HX PULMONARY EMBOLISM: Status: ACTIVE | Noted: 2024-12-29

## 2024-12-29 PROBLEM — G93.41 ACUTE METABOLIC ENCEPHALOPATHY: Status: ACTIVE | Noted: 2024-12-29

## 2024-12-29 LAB
ANION GAP SERPL CALC-SCNC: 5 MMOL/L (ref 2–12)
BASOPHILS # BLD: 0 K/UL (ref 0–0.1)
BASOPHILS NFR BLD: 0 % (ref 0–1)
BUN SERPL-MCNC: 11 MG/DL (ref 6–20)
BUN/CREAT SERPL: 17 (ref 12–20)
CALCIUM SERPL-MCNC: 8.4 MG/DL (ref 8.5–10.1)
CHLORIDE SERPL-SCNC: 110 MMOL/L (ref 97–108)
CO2 SERPL-SCNC: 24 MMOL/L (ref 21–32)
CREAT SERPL-MCNC: 0.66 MG/DL (ref 0.55–1.02)
DIFFERENTIAL METHOD BLD: NORMAL
ECHO BSA: 1.71 M2
EKG ATRIAL RATE: 90 BPM
EKG DIAGNOSIS: NORMAL
EKG P-R INTERVAL: 134 MS
EKG Q-T INTERVAL: 376 MS
EKG QRS DURATION: 72 MS
EKG QTC CALCULATION (BAZETT): 459 MS
EKG R AXIS: 195 DEGREES
EKG T AXIS: 132 DEGREES
EKG VENTRICULAR RATE: 90 BPM
EOSINOPHIL # BLD: 0 K/UL (ref 0–0.4)
EOSINOPHIL NFR BLD: 1 % (ref 0–7)
ERYTHROCYTE [DISTWIDTH] IN BLOOD BY AUTOMATED COUNT: 13.2 % (ref 11.5–14.5)
FOLATE SERPL-MCNC: 9.9 NG/ML (ref 5–21)
GLUCOSE SERPL-MCNC: 93 MG/DL (ref 65–100)
HCT VFR BLD AUTO: 40.6 % (ref 35–47)
HGB BLD-MCNC: 13.3 G/DL (ref 11.5–16)
IMM GRANULOCYTES # BLD AUTO: 0 K/UL (ref 0–0.04)
IMM GRANULOCYTES NFR BLD AUTO: 0 % (ref 0–0.5)
LYMPHOCYTES # BLD: 1.6 K/UL (ref 0.8–3.5)
LYMPHOCYTES NFR BLD: 24 % (ref 12–49)
MAGNESIUM SERPL-MCNC: 1.9 MG/DL (ref 1.6–2.4)
MCH RBC QN AUTO: 28.7 PG (ref 26–34)
MCHC RBC AUTO-ENTMCNC: 32.8 G/DL (ref 30–36.5)
MCV RBC AUTO: 87.7 FL (ref 80–99)
MONOCYTES # BLD: 0.7 K/UL (ref 0–1)
MONOCYTES NFR BLD: 11 % (ref 5–13)
NEUTS SEG # BLD: 4.2 K/UL (ref 1.8–8)
NEUTS SEG NFR BLD: 64 % (ref 32–75)
NRBC # BLD: 0 K/UL (ref 0–0.01)
NRBC BLD-RTO: 0 PER 100 WBC
PLATELET # BLD AUTO: 240 K/UL (ref 150–400)
PMV BLD AUTO: 9.1 FL (ref 8.9–12.9)
POTASSIUM SERPL-SCNC: 3.9 MMOL/L (ref 3.5–5.1)
RBC # BLD AUTO: 4.63 M/UL (ref 3.8–5.2)
SODIUM SERPL-SCNC: 139 MMOL/L (ref 136–145)
VAS LEFT CCA DIST EDV: 19.9 CM/S
VAS LEFT CCA DIST PSV: 72.8 CM/S
VAS LEFT CCA PROX EDV: 19.9 CM/S
VAS LEFT CCA PROX PSV: 83.7 CM/S
VAS LEFT ECA EDV: 9.3 CM/S
VAS LEFT ECA PSV: 83.7 CM/S
VAS LEFT ICA DIST EDV: 39.9 CM/S
VAS LEFT ICA DIST PSV: 122 CM/S
VAS LEFT ICA MID EDV: 23.5 CM/S
VAS LEFT ICA MID PSV: 83.7 CM/S
VAS LEFT ICA PROX EDV: 16.2 CM/S
VAS LEFT ICA PROX PSV: 56.4 CM/S
VAS LEFT ICA/CCA PSV: 1.7 NO UNITS
VAS LEFT SUBCLAVIAN PROX EDV: 0 CM/S
VAS LEFT SUBCLAVIAN PROX PSV: 105.8 CM/S
VAS LEFT VERTEBRAL EDV: 13.4 CM/S
VAS LEFT VERTEBRAL PSV: 44.1 CM/S
VAS RIGHT CCA DIST EDV: 13.1 CM/S
VAS RIGHT CCA DIST PSV: 51.5 CM/S
VAS RIGHT CCA PROX EDV: 16.2 CM/S
VAS RIGHT CCA PROX PSV: 92.9 CM/S
VAS RIGHT ECA EDV: 7.2 CM/S
VAS RIGHT ECA PSV: 77.3 CM/S
VAS RIGHT ICA DIST EDV: 18.3 CM/S
VAS RIGHT ICA DIST PSV: 56.3 CM/S
VAS RIGHT ICA MID EDV: 22 CM/S
VAS RIGHT ICA MID PSV: 58.8 CM/S
VAS RIGHT ICA PROX EDV: 13.4 CM/S
VAS RIGHT ICA PROX PSV: 77.2 CM/S
VAS RIGHT ICA/CCA PSV: 1.5 NO UNITS
VAS RIGHT SUBCLAVIAN PROX EDV: 0 CM/S
VAS RIGHT SUBCLAVIAN PROX PSV: 94.7 CM/S
VAS RIGHT VERTEBRAL EDV: 18.3 CM/S
VAS RIGHT VERTEBRAL PSV: 62.5 CM/S
WBC # BLD AUTO: 6.6 K/UL (ref 3.6–11)

## 2024-12-29 PROCEDURE — A9579 GAD-BASE MR CONTRAST NOS,1ML: HCPCS | Performed by: RADIOLOGY

## 2024-12-29 PROCEDURE — 97165 OT EVAL LOW COMPLEX 30 MIN: CPT

## 2024-12-29 PROCEDURE — 97535 SELF CARE MNGMENT TRAINING: CPT

## 2024-12-29 PROCEDURE — 80048 BASIC METABOLIC PNL TOTAL CA: CPT

## 2024-12-29 PROCEDURE — 85025 COMPLETE CBC W/AUTO DIFF WBC: CPT

## 2024-12-29 PROCEDURE — 36415 COLL VENOUS BLD VENIPUNCTURE: CPT

## 2024-12-29 PROCEDURE — 93880 EXTRACRANIAL BILAT STUDY: CPT

## 2024-12-29 PROCEDURE — 83735 ASSAY OF MAGNESIUM: CPT

## 2024-12-29 PROCEDURE — 6370000000 HC RX 637 (ALT 250 FOR IP): Performed by: INTERNAL MEDICINE

## 2024-12-29 PROCEDURE — 93010 ELECTROCARDIOGRAM REPORT: CPT | Performed by: INTERNAL MEDICINE

## 2024-12-29 PROCEDURE — 82746 ASSAY OF FOLIC ACID SERUM: CPT

## 2024-12-29 PROCEDURE — 1100000000 HC RM PRIVATE

## 2024-12-29 PROCEDURE — 70553 MRI BRAIN STEM W/O & W/DYE: CPT

## 2024-12-29 PROCEDURE — 94761 N-INVAS EAR/PLS OXIMETRY MLT: CPT

## 2024-12-29 PROCEDURE — 99222 1ST HOSP IP/OBS MODERATE 55: CPT | Performed by: PSYCHIATRY & NEUROLOGY

## 2024-12-29 PROCEDURE — 6360000002 HC RX W HCPCS: Performed by: INTERNAL MEDICINE

## 2024-12-29 PROCEDURE — 6360000004 HC RX CONTRAST MEDICATION: Performed by: RADIOLOGY

## 2024-12-29 PROCEDURE — 2500000003 HC RX 250 WO HCPCS: Performed by: INTERNAL MEDICINE

## 2024-12-29 RX ADMIN — HYDROCORTISONE 10 MG: 10 TABLET ORAL at 07:26

## 2024-12-29 RX ADMIN — METOPROLOL TARTRATE 50 MG: 50 TABLET, FILM COATED ORAL at 07:27

## 2024-12-29 RX ADMIN — HYDROCORTISONE 10 MG: 10 TABLET ORAL at 21:08

## 2024-12-29 RX ADMIN — METOPROLOL TARTRATE 50 MG: 50 TABLET, FILM COATED ORAL at 21:08

## 2024-12-29 RX ADMIN — SODIUM CHLORIDE, PRESERVATIVE FREE 10 ML: 5 INJECTION INTRAVENOUS at 07:27

## 2024-12-29 RX ADMIN — RIVAROXABAN 20 MG: 20 TABLET, FILM COATED ORAL at 07:27

## 2024-12-29 RX ADMIN — GADOTERIDOL 13 ML: 279.3 INJECTION, SOLUTION INTRAVENOUS at 15:34

## 2024-12-29 RX ADMIN — SODIUM CHLORIDE, PRESERVATIVE FREE 10 ML: 5 INJECTION INTRAVENOUS at 21:09

## 2024-12-29 RX ADMIN — WATER 1000 MG: 1 INJECTION INTRAMUSCULAR; INTRAVENOUS; SUBCUTANEOUS at 21:08

## 2024-12-29 NOTE — H&P
Hospitalist Admission Note    NAME:  Anastacia Oliver   :  1945   MRN:  [id number]     Date/Time:  2024 9:30 PM    Patient PCP: Ashley Mcguire MD  ________________________________________________________________________    Assessment / Plan:    This is an 80 y/o F PMHx of endometrial cancer, mets to lungs, DVT/PE (on Xarelto), who presents to the ER with altered mental status.    #Altered Mental Status, acute on chronic  -? Underlying dementia  -CT head negative for acute process  -check MRI Brain, EEG, ECHO, Carotids  -follows with Neurology, Dr. Marte, was due to have above w/u this month   -Neurochecks/Telemonitoring   -treat underlying infection. Check TSH/ammonia/B12  -appreciate Neurology eval     #Agitation/Hallucinations  -consider Psych eval if above w/u negative     #UTI  -likely contributing to AMS above  -c/w Rocephin, f/u Urine Cultures    #Hx of Endometrial Cancer (mets to lungs)  -s/p chemo/radiation, immunotherapy, 5 years in remission per family.     #Hypokalemia  -repleted    #Abnormal Tele  -brief run of VT reported in ER, pt. Asymptomatic  -continue to monitor electrolytes and tele    #Hx of DVT/PE  -c/w home Xarelto (already taken dose today)    #? Hx of Adrenal Insufficiency  -on chronic solucortef 10 mg bid at home, continue     #HTN  -c/w home Toprol     #? Hx of COPD  -on Trelegy at home  -c/w prn nebs    Code Status: FULL CODE   DVT Prophylaxis: not needed, on Xarelto    NEXT OF KIN:  Daughter, Page Liu, updated at bedside [phone]  Shared Decision Making. Patient/family are agreeable for this admission. They verbalized understanding and are agreeable with plan of care. All questions were answered.          Subjective:   CHIEF COMPLAINT:  altered mental status     HISTORY OF PRESENT ILLNESS:     This is an 80 y/o F PMHx of endometrial cancer, mets to lungs, DVT/PE (on Xarelto), who presents to the ER with altered mental status. History obtained from daughter at

## 2024-12-30 PROCEDURE — 6360000002 HC RX W HCPCS: Performed by: INTERNAL MEDICINE

## 2024-12-30 PROCEDURE — 97161 PT EVAL LOW COMPLEX 20 MIN: CPT

## 2024-12-30 PROCEDURE — 94761 N-INVAS EAR/PLS OXIMETRY MLT: CPT

## 2024-12-30 PROCEDURE — 6370000000 HC RX 637 (ALT 250 FOR IP): Performed by: INTERNAL MEDICINE

## 2024-12-30 PROCEDURE — 2500000003 HC RX 250 WO HCPCS: Performed by: INTERNAL MEDICINE

## 2024-12-30 PROCEDURE — 1100000000 HC RM PRIVATE

## 2024-12-30 PROCEDURE — 97530 THERAPEUTIC ACTIVITIES: CPT

## 2024-12-30 PROCEDURE — 95816 EEG AWAKE AND DROWSY: CPT | Performed by: PSYCHIATRY & NEUROLOGY

## 2024-12-30 PROCEDURE — 6370000000 HC RX 637 (ALT 250 FOR IP): Performed by: STUDENT IN AN ORGANIZED HEALTH CARE EDUCATION/TRAINING PROGRAM

## 2024-12-30 PROCEDURE — 97116 GAIT TRAINING THERAPY: CPT

## 2024-12-30 PROCEDURE — 95819 EEG AWAKE AND ASLEEP: CPT

## 2024-12-30 RX ORDER — QUETIAPINE FUMARATE 25 MG/1
12.5 TABLET, FILM COATED ORAL 2 TIMES DAILY
Status: DISCONTINUED | OUTPATIENT
Start: 2024-12-30 | End: 2024-12-31 | Stop reason: HOSPADM

## 2024-12-30 RX ADMIN — SODIUM CHLORIDE, PRESERVATIVE FREE 10 ML: 5 INJECTION INTRAVENOUS at 07:55

## 2024-12-30 RX ADMIN — RIVAROXABAN 20 MG: 20 TABLET, FILM COATED ORAL at 07:55

## 2024-12-30 RX ADMIN — Medication 6 MG: at 21:18

## 2024-12-30 RX ADMIN — HYDROCORTISONE 10 MG: 10 TABLET ORAL at 07:55

## 2024-12-30 RX ADMIN — QUETIAPINE FUMARATE 12.5 MG: 25 TABLET ORAL at 21:18

## 2024-12-30 RX ADMIN — WATER 1000 MG: 1 INJECTION INTRAMUSCULAR; INTRAVENOUS; SUBCUTANEOUS at 21:22

## 2024-12-30 RX ADMIN — METOPROLOL TARTRATE 50 MG: 50 TABLET, FILM COATED ORAL at 21:18

## 2024-12-30 RX ADMIN — QUETIAPINE FUMARATE 12.5 MG: 25 TABLET ORAL at 12:56

## 2024-12-30 RX ADMIN — METOPROLOL TARTRATE 50 MG: 50 TABLET, FILM COATED ORAL at 07:55

## 2024-12-30 RX ADMIN — HYDROCORTISONE 10 MG: 10 TABLET ORAL at 21:18

## 2024-12-30 NOTE — CASE COMMUNICATION
impairing medications (opioids, benzodiazepines), elderly patient population, baseline history of memory/cognitive disorders.  - Can consider outpatient neurology [facility] referral for further evaluation of baseline memory and thinking. This appointment should be no sooner than 3 months from the time of hospitalization and/or surgery.       I explained in detail about the current condition.   Rest of the management per primary team.  Neurology will sign off.   Please do not hesitate to call with questions or concerns.  Please let us know if we can provide any additional information.

## 2024-12-30 NOTE — PROCEDURES
Procedures    [facility]   Electroencephalogram  Report    Procedure ID: [id number] Procedure Date: 12/30/2024   Patient Name: Anastacia Oliver YOB: 1945   Procedure Type: Routine Medical Record No: [id number]       An EEG is requested in this 79-year-old lady to evaluate for epileptiform abnormalities.  Her medications are listed as Rocephin, Xarelto, Cortef and Lopressor    This tracing is obtained while the patient is noted to be awake and slightly confused.    During this state, there are brief intermittent runs of posteriorly dominant and symmetrical low to medium amplitude 9 cps activities which attenuate with eye opening.  Lower voltage faster frequency activities are seen symmetrically over the anterior head regions.    Hyperventilation is not performed.  Intermittent photic stimulation little alters the tracing.    Sleep architecture is not seen.    Interpretation  This EEG recorded during the awake state is normal.        Ember Marte MD

## 2024-12-30 NOTE — PROGRESS NOTES
[facility]  [address] [place] [address]  [phone]      Hospitalist  Progress Note      NAME:       Anastacia Oliver   :        1945  MRM:        [id number]    Date of service: 2024      Subjective: Patient seen and examined by me. Patient admitted with acute encephalopathy. She remains calm but obviously very forgetful. No fever or chills. Not vomiting. Not agitated      Objective:    Vital Signs:    /83   Pulse 75   Temp 97.9 °F (36.6 °C) (Oral)   Resp 15   Ht 1.549 m (5' 1\")   Wt 68 kg (150 lb)   SpO2 94%   BMI 28.34 kg/m²      No intake or output data in the 24 hours ending 24 1317     Current inpatient medications reviewed:  Current Facility-Administered Medications   Medication Dose Route Frequency    sodium chloride flush 0.9 % injection 5-40 mL  5-40 mL IntraVENous 2 times per day    sodium chloride flush 0.9 % injection 5-40 mL  5-40 mL IntraVENous PRN    0.9 % sodium chloride infusion   IntraVENous PRN    cefTRIAXone (ROCEPHIN) 1,000 mg in sterile water 10 mL IV syringe  1,000 mg IntraVENous Q24H    ipratropium 0.5 mg-albuterol 2.5 mg (DUONEB) nebulizer solution 1 Dose  1 Dose Inhalation Q4H PRN    rivaroxaban (XARELTO) tablet 20 mg  20 mg Oral Daily with breakfast    hydrocortisone (CORTEF) tablet 10 mg  10 mg Oral BID    metoprolol tartrate (LOPRESSOR) tablet 50 mg  50 mg Oral BID     Physical Examination:    General:   Weak and ill looking patient in no acute distress  Eyes:   pink conjunctivae, PERRLA with no discharge.  ENT:   no ottorrhea or rhinorrhea with dry mucous membranes  Neck: no masses, thyroid non-tender and trachea central.  Pulm:  no accessory muscle use, clear breath sounds without crackles or wheezes  Card:  no JVD or murmurs, has regular and normal S1, S2 without thrills, bruits or peripheral edema  Abd:  Soft, non-tender, non-distended, 
Carotid duplex completed. Final results to follow.   
EEG completed  
MRI checklist completed and tubed.   
underlying dementia. CT scan head showed no acute changes. Tox screen neg. TSH, ethanol levels normal, Vitamin B12 high. MRI brain showed no acute infarction. Carotid dopplers showed no no sig stenosis. EEG pending. Continue to treat the UTI. Outpatient neuropsychological testing     UTI (urinary tract infection) POA: suspected. Urine cultures isolating Gram neg rods. Continue IV Ceftriaxone.      COPD (chronic obstructive pulmonary disease) POA: not in acute exacerbation. Not hypoxic. DuoNebs PRN     HTN (hypertension), benign POA: BP variable bit overall well controlled. 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 To assist coordination of care and communication with nursing and staff, this note may be preliminary early in the day, but finalized by end of the day.         ___________________________________________________    Attending Physician:   Mookie Alvarez MD

## 2024-12-31 ENCOUNTER — APPOINTMENT (OUTPATIENT)
Facility: HOSPITAL | Age: 79
DRG: 689 | End: 2024-12-31
Attending: INTERNAL MEDICINE
Payer: MEDICARE

## 2024-12-31 VITALS
OXYGEN SATURATION: 94 % | SYSTOLIC BLOOD PRESSURE: 108 MMHG | RESPIRATION RATE: 18 BRPM | WEIGHT: 150 LBS | TEMPERATURE: 97.7 F | HEART RATE: 86 BPM | DIASTOLIC BLOOD PRESSURE: 89 MMHG | BODY MASS INDEX: 28.32 KG/M2 | HEIGHT: 61 IN

## 2024-12-31 LAB
BACTERIA SPEC CULT: ABNORMAL
BACTERIA SPEC CULT: ABNORMAL
CC UR VC: ABNORMAL
ECHO AO ARCH DIAM: 2 CM
ECHO AO ASC DIAM: 2.9 CM
ECHO AO ASCENDING AORTA INDEX: 1.74 CM/M2
ECHO AO ROOT DIAM: 2.9 CM
ECHO AO ROOT INDEX: 1.74 CM/M2
ECHO AV AREA PEAK VELOCITY: 3.1 CM2
ECHO AV AREA VTI: 3 CM2
ECHO AV AREA/BSA PEAK VELOCITY: 1.9 CM2/M2
ECHO AV AREA/BSA VTI: 1.8 CM2/M2
ECHO AV MEAN GRADIENT: 3 MMHG
ECHO AV MEAN VELOCITY: 0.8 M/S
ECHO AV PEAK GRADIENT: 5 MMHG
ECHO AV PEAK VELOCITY: 1.1 M/S
ECHO AV VELOCITY RATIO: 0.91
ECHO AV VTI: 24.1 CM
ECHO BSA: 1.71 M2
ECHO LA DIAMETER INDEX: 1.62 CM/M2
ECHO LA DIAMETER: 2.7 CM
ECHO LA TO AORTIC ROOT RATIO: 0.93
ECHO LV EF PHYSICIAN: 45 %
ECHO LV FRACTIONAL SHORTENING: 39 % (ref 28–44)
ECHO LV INTERNAL DIMENSION DIASTOLE INDEX: 1.98 CM/M2
ECHO LV INTERNAL DIMENSION DIASTOLIC: 3.3 CM (ref 3.9–5.3)
ECHO LV INTERNAL DIMENSION SYSTOLIC INDEX: 1.2 CM/M2
ECHO LV INTERNAL DIMENSION SYSTOLIC: 2 CM
ECHO LV IVSD: 1 CM (ref 0.6–0.9)
ECHO LV MASS 2D: 94.6 G (ref 67–162)
ECHO LV MASS INDEX 2D: 56.6 G/M2 (ref 43–95)
ECHO LV POSTERIOR WALL DIASTOLIC: 1 CM (ref 0.6–0.9)
ECHO LV RELATIVE WALL THICKNESS RATIO: 0.61
ECHO LVOT AREA: 3.5 CM2
ECHO LVOT AV VTI INDEX: 0.85
ECHO LVOT DIAM: 2.1 CM
ECHO LVOT MEAN GRADIENT: 2 MMHG
ECHO LVOT PEAK GRADIENT: 4 MMHG
ECHO LVOT PEAK VELOCITY: 1 M/S
ECHO LVOT STROKE VOLUME INDEX: 42.3 ML/M2
ECHO LVOT SV: 70.6 ML
ECHO LVOT VTI: 20.4 CM
ECHO PV MAX VELOCITY: 0.8 M/S
ECHO PV PEAK GRADIENT: 2 MMHG
ECHO TV REGURGITANT MAX VELOCITY: 2.63 M/S
ECHO TV REGURGITANT PEAK GRADIENT: 28 MMHG
SERVICE CMNT-IMP: ABNORMAL

## 2024-12-31 PROCEDURE — 93306 TTE W/DOPPLER COMPLETE: CPT

## 2024-12-31 PROCEDURE — 93306 TTE W/DOPPLER COMPLETE: CPT | Performed by: INTERNAL MEDICINE

## 2024-12-31 PROCEDURE — 2500000003 HC RX 250 WO HCPCS: Performed by: INTERNAL MEDICINE

## 2024-12-31 PROCEDURE — 97116 GAIT TRAINING THERAPY: CPT

## 2024-12-31 PROCEDURE — 94761 N-INVAS EAR/PLS OXIMETRY MLT: CPT

## 2024-12-31 PROCEDURE — 6370000000 HC RX 637 (ALT 250 FOR IP): Performed by: INTERNAL MEDICINE

## 2024-12-31 PROCEDURE — 97535 SELF CARE MNGMENT TRAINING: CPT

## 2024-12-31 PROCEDURE — 97530 THERAPEUTIC ACTIVITIES: CPT

## 2024-12-31 RX ORDER — QUETIAPINE FUMARATE 25 MG/1
12.5 TABLET, FILM COATED ORAL 2 TIMES DAILY
Qty: 60 TABLET | Refills: 1 | Status: SHIPPED | OUTPATIENT
Start: 2024-12-31 | End: 2024-12-31

## 2024-12-31 RX ORDER — QUETIAPINE FUMARATE 25 MG/1
12.5 TABLET, FILM COATED ORAL 2 TIMES DAILY
Qty: 30 TABLET | Refills: 1 | Status: SHIPPED | OUTPATIENT
Start: 2024-12-31 | End: 2025-01-15

## 2024-12-31 RX ADMIN — METOPROLOL TARTRATE 50 MG: 50 TABLET, FILM COATED ORAL at 07:39

## 2024-12-31 RX ADMIN — SODIUM CHLORIDE, PRESERVATIVE FREE 10 ML: 5 INJECTION INTRAVENOUS at 07:39

## 2024-12-31 RX ADMIN — HYDROCORTISONE 10 MG: 10 TABLET ORAL at 07:39

## 2024-12-31 RX ADMIN — RIVAROXABAN 20 MG: 20 TABLET, FILM COATED ORAL at 07:39

## 2024-12-31 RX ADMIN — QUETIAPINE FUMARATE 12.5 MG: 25 TABLET ORAL at 07:39

## 2024-12-31 NOTE — DISCHARGE INSTRUCTIONS
Hospital Medicine DISCHARGE INSTRUCTIONS    NAME: Anastacia Oliver   :  1945   MRN:  [id number]     Date:     2024    Admission date: 2024     Discharge date:  2024     Reason for your admission:  Acute encephalopathy [G93.40]  AMS (altered mental status) [R41.82]    Discharge Diagnoses:  Resolved acute metabolic encephalopathy  Memory impairment    DISCHARGE INSTRUCTIONS:    Thank you for allowing us to participate in your care. Your discharging Hospitalist is Mookie Alvarez MD. You were admitted for evaluation and treatment for the above diagnoses.    Medications:     It is important that medications are taken exactly as they are prescribed on the discharge medication instructions and keep them your  in the bottles provided by the pharmacist.   Keep a list of the medication names, dosages, and times to be taken at all times.    Do not take other medications without consulting your doctor.     Recommended diet:  regular diet    Recommended activity: activity as tolerated    Post discharge care:    For questions regarding your Hospitalization or to contact the Hospital Medicine team, please call [phone].    Notify follow up health care provider or return to the emergency department if you cannot get hold of your doctor if you feel worse or experience symptoms similar to those that brought you to hospital    Ashley Mcguire MD  [address] [facility] [address] [phone]    Schedule an appointment as soon as possible for a visit  to schedule a regular follow up after discharge    Suzanne High PSYD  [address] [address]  NEUROLOGY CLINIC [facility] [address] [phone]    Call  to schedule follow up with neuropsychology ASAP if not already done       Information obtained by :  I understand that if any problems occur once I am at home I am to contact my physician and I understand and acknowledge receipt of the instructions indicated above.

## 2024-12-31 NOTE — CARE COORDINATION
12/31/2024    4:17 PM  [facility] no longer accepting. Referral submitted via [employer] to Ender Wilde , and they accepted.     1:33 PM  Care Management Progress Note    Reason for Admission:   Acute cystitis without hematuria [N30.00]  Acute encephalopathy [G93.40]  AMS (altered mental status) [R41.82]         Patient Admission Status: Inpatient  Date Admitted to INP: 9%  []NA - OBS/Outpatient  RUR: Readmission Risk Score: 9.2    Hospitalization in the last 30 days (Readmission):  No        Transition of care plan:  Discharge order submitted. Pt has medically cleared.   Home with HH and 24 hour care - Pt's DTR coordinating with [employer] for personal care, and family will provide care until services begin. Care [employer] liaison reported they are able to start immediately. HH arranged with [facility].   Date IM given: 12/31/24  []NA  Outpatient follow-up.  Discharge transport: Family       12/30/24 1624   Service Assessment   Patient Orientation Unable to Assess   Cognition Dementia / Early Alzheimer's   History Provided By Child/Family;Significant Other   Primary Caregiver Self   Support Systems Spouse/Significant Other;Children   Patient's Healthcare Decision Maker is: Legal Next of Kin   PCP Verified by CM Yes   Last Visit to PCP Within last 3 months   Prior Functional Level Independent in ADLs/IADLs   Can patient return to prior living arrangement Unknown at present   Ability to make needs known: Fair   Family able to assist with home care needs: Yes  ( can assist some, but DTR has concern for family care.)   Would you like for me to discuss the discharge plan with any other family members/significant others, and if so, who? No   Financial Resources Medicare   Social/Functional History   Lives With Significant other   Type of Home House   Home Layout One level   Entrance Stairs - Number of Steps 5   Home Equipment Walker - Rolling   Receives Help From Family   Prior Level of Assist for ADLs 
Services Prior To Admission None   Patient expects to be discharged to: House   Services At/After Discharge   Mode of Transport at Discharge Other (see comment)   Confirm Follow Up Transport Family       Comments: CM met with pt's  and pt's DTR for assessment as pt was ALEKS. Demographics confirmed. Pt lives independently in a private residence with her . Reportedly, pt has a rollator and RW in storage but does not like to use it.    Discharge Concerns: [x]Yes []No []Unknown   Describe: Pt's DTR expressed concerns for parents needed more care in the home.     Financial concerns/barriers: []Yes, explain: []No [x]Unknown/Not discussed  __________________________________________________________________________    Insurer:   Active Insurance as of 12/28/2024       Primary Coverage       Payor Plan Insurance Group Employer/Plan Group    HUMANA MEDICARE HUMANA CHOICE-PPO MEDICARE [id number]       Payor Plan Address Payor Plan Phone Number Payor Plan Fax Number Effective Dates    [address] [address]   1/1/2024 - None Entered    [place] [address]         Subscriber Name Subscriber Birth Date Member ID       ALEC QUINTERO 1945 [id number]                     PCP: Ashley Mcguire MD   Address: [address] [place] [address]   Phone number: [phone]    Pharmacy:   [id number] [address] - P [phone] - F [phone]  [address] [facility] [address]  Phone: [phone] Fax: [phone]    DC Transport: (P) Family       Transition of care plan:    []Unable to determine at this time. Awaiting clinical progress, and disposition recommendations.    [] Home. No assistance required.     [] Home. Pt refused recommended services.    [] Home with family assistance as needed, and outpatient follow-up.    [] Home with Outpatient PT and outpatient follow-up   Pt aware of OP appt? []Yes, Provider:   []Not scheduled   Transport provider:     []

## 2024-12-31 NOTE — DISCHARGE SUMMARY
[facility]  [address] [place] [address]  Tel: [phone]    Hospital Medicine Discharge Summary    Patient ID:    Anastacia Oliver  Age:              79 y.o.    : 1945  MRN:             [id number]     PCP: Ashley Mcguire MD     Date of Admission: 2024    Date of Discharge:  2024    Discharge Diagnoses:  Principal Problem:    Acute metabolic encephalopathy    HTN (hypertension), benign    UTI (urinary tract infection)    Hx pulmonary embolism    Chronic adrenocortical insufficiency (HCC)    COPD (chronic obstructive pulmonary disease) (HCC)    Memory impairment      Reason for admission:    Acute cystitis without hematuria [N30.00]  Acute encephalopathy [G93.40]  AMS (altered mental status) [R41.82]    Diagnostic testing:    Laboratory data reviewed and independently interpreted:    Recent Labs     24  0555   WBC 6.6   HGB 13.3   HCT 40.6   RBC 4.63   MCV 87.7   MCH 28.7        No results found for: \"LACTA\"  Recent Labs     24  0555      K 3.9   *   CO2 24   GLUCOSE 93   BUN 11   CREATININE 0.66   CALCIUM 8.4*   MG 1.9     No components found for: \"GLUCOSEPOC\"  No results found for: \"CHOL\", \"TRIG\", \"HDL\"    Imaging data reviewed:    MRI BRAIN W WO CONTRAST    Result Date: 2024  Mild chronic microvascular ischemic change and mild cerebral atrophy. Left maxillary ethmoid and frontal sinus disease. No intracranial mass, hemorrhage or evidence of acute infarction. Electronically signed by [employer]    CT HEAD WO CONTRAST    Result Date: 2024  No acute intracranial process. Imaging findings consistent with mild chronic microvascular ischemic change. There is a mild degree of cerebral atrophy. Electronically signed by LUCIE ERIK    Hospital Course:     Ms. Oliver is a 79 y.o. admitted to [facility] and treated for the following:    Acute metabolic encephalopathy / Memory

## 2024-12-31 NOTE — PLAN OF CARE
Problem: Discharge Planning  Goal: Discharge to home or other facility with appropriate resources  12/30/2024 0953 by Pastora Chopra, RN  Outcome: Progressing  12/30/2024 0126 by Nora Alamo, RN  Outcome: Progressing     Problem: Safety - Adult  Goal: Free from fall injury  12/30/2024 0953 by Pastora Chopra, RN  Outcome: Progressing  12/30/2024 0126 by Nora Alamo, RN  Outcome: Progressing     
  Problem: Discharge Planning  Goal: Discharge to home or other facility with appropriate resources  12/31/2024 0921 by Pastora Chopra, RN  Outcome: Progressing  12/31/2024 0011 by Nora Alamo, RN  Outcome: Progressing     Problem: Safety - Adult  Goal: Free from fall injury  12/31/2024 0921 by Pastora Chopra, RN  Outcome: Progressing  12/31/2024 0011 by Nora Alamo, RN  Outcome: Progressing     
  Problem: Discharge Planning  Goal: Discharge to home or other facility with appropriate resources  Outcome: Progressing     Problem: Safety - Adult  Goal: Free from fall injury  Outcome: Progressing     
  Problem: Occupational Therapy - Adult  Goal: By Discharge: Performs self-care activities at highest level of function for planned discharge setting.  See evaluation for individualized goals.  Description: FUNCTIONAL STATUS PRIOR TO ADMISSION:  Pt has a rollator but does not use it, per lara, she uses her  for steadying support   , Prior Level of Assist for ADLs: Independent,  ,  ,  ,  ,  , Prior Level of Assist for Homemaking: Needs assistance,  , Prior Level of Assist for Transfers: Independent, Active : No     HOME SUPPORT: Patient lived with spouse but didn't require assistance.    Occupational Therapy Goals:  Initiated 12/29/2024  1.  Patient will perform grooming with Modified [place] within 7 day(s).  2.  Patient will perform bathing with Modified [place] within 7 day(s).  3.  Patient will perform lower body dressing with Modified [place] within 7 day(s).  4.  Patient will perform toilet transfers with Modified [place]  within 7 day(s).  5.  Patient will perform all aspects of toileting with Modified [place] within 7 day(s).    Outcome: Progressing   OCCUPATIONAL THERAPY TREATMENT  Patient: Anastacia Oliver (79 y.o. female)  Date: 12/31/2024  Primary Diagnosis: Acute cystitis without hematuria [N30.00]  Acute encephalopathy [G93.40]  AMS (altered mental status) [R41.82]       Precautions: Fall Risk                Chart, occupational therapy assessment, plan of care, and goals were reviewed.    ASSESSMENT  Patient continues to benefit from skilled OT services and is progressing towards goals. Pt seated in chair with family present. She ambulated to restroom and stood to apply shampoo cap and to comb her hair stand by assist. Pt stated feeling much better than yesterday. HR 94 with activity.              PLAN :  Patient continues to benefit from skilled intervention to address the above impairments.  Continue treatment per established plan of care to address 
  Problem: Physical Therapy - Adult  Goal: By Discharge: Performs mobility at highest level of function for planned discharge setting.  See evaluation for individualized goals.  Description: FUNCTIONAL STATUS PRIOR TO ADMISSION: Pt reports indep LOF with mobility, only occasional use of rollator. Denies fall history. Per chart review, pt's daughter noted concerns about pt's cognition. Lives with supportive .    Physical Therapy Goals  Initiated 12/30/2024  1.  Patient will move from supine to sit and sit to supine in bed with independence within 7 day(s).    2.  Patient will perform sit to stand with supervision/set-up within 7 day(s).  3.  Patient will transfer from bed to chair and chair to bed with supervision/set-up using the least restrictive device within 7 day(s).  4.  Patient will ambulate with supervision/set-up for 150 feet with the least restrictive device within 7 day(s).   5.  Patient will ascend/descend 5 stairs with bilat handrail(s) with contact guard assist within 7 day(s).   Outcome: Progressing   PHYSICAL THERAPY TREATMENT    Patient: Anastacia Oliver (79 y.o. female)  Date: 12/31/2024  Diagnosis: Acute cystitis without hematuria [N30.00]  Acute encephalopathy [G93.40]  AMS (altered mental status) [R41.82] Acute metabolic encephalopathy      Precautions: Fall Risk                      ASSESSMENT:  Patient continues to benefit from skilled PT services and is progressing towards goals. Pt denies pain or SOB with activity. A&O x 4 preferred use of rollator for longer distance gait training. Requiring only SBA for transfer into  without AD. /89 HR 94.          PLAN:  Patient continues to benefit from skilled intervention to address the above impairments.  Continue treatment per established plan of care.    Recommendations for staff mobility and toileting assistance:  Recommend toileting using  recommended toilet device: the bathroom with staff assist x1 using  gait belt.    Recommend for 
Patient discharged home, VSS. No complaints of pain. Paperwork reviewed with the patient and family in detail.     Problem: Occupational Therapy - Adult  Goal: By Discharge: Performs self-care activities at highest level of function for planned discharge setting.  See evaluation for individualized goals.  Description: FUNCTIONAL STATUS PRIOR TO ADMISSION:  Pt has a rollator but does not use it, per paulougther, she uses her  for steadying support   , Prior Level of Assist for ADLs: Independent,  ,  ,  ,  ,  , Prior Level of Assist for Homemaking: Needs assistance,  , Prior Level of Assist for Transfers: Independent, Active : No     HOME SUPPORT: Patient lived with spouse but didn't require assistance.    Occupational Therapy Goals:  Initiated 12/29/2024  1.  Patient will perform grooming with Modified [place] within 7 day(s).  2.  Patient will perform bathing with Modified [place] within 7 day(s).  3.  Patient will perform lower body dressing with Modified [place] within 7 day(s).  4.  Patient will perform toilet transfers with Modified [place]  within 7 day(s).  5.  Patient will perform all aspects of toileting with Modified [place] within 7 day(s).    12/31/2024 1052 by Alicia Issa OTA  Outcome: Progressing     Problem: Physical Therapy - Adult  Goal: By Discharge: Performs mobility at highest level of function for planned discharge setting.  See evaluation for individualized goals.  Description: FUNCTIONAL STATUS PRIOR TO ADMISSION: Pt reports indep LOF with mobility, only occasional use of rollator. Denies fall history. Per chart review, pt's daughter noted concerns about pt's cognition. Lives with supportive .    Physical Therapy Goals  Initiated 12/30/2024  1.  Patient will move from supine to sit and sit to supine in bed with independence within 7 day(s).    2.  Patient will perform sit to stand with supervision/set-up within 7 day(s).  3.  Patient will transfer 
functional      Coordination:  Coordination: Within functional limits            Tone & Sensation:   Tone: Normal  Sensation: Intact          Functional Mobility and Transfers for ADLs:    Bed Mobility:     Bed Mobility Training  Bed Mobility Training: Yes  Rolling: Stand-by assistance  Supine to Sit: Stand-by assistance  Sit to Supine: Stand-by assistance  Scooting: Modified independent    Transfers:      Transfer Training  Transfer Training: Yes  Overall Level of Assistance: Contact-guard assistance  Interventions: Verbal cues;Tactile cues  Sit to Stand: Contact-guard assistance  Stand to Sit: Contact-guard assistance  Bed to Chair: Contact-guard assistance  Toilet Transfer: Contact-guard assistance          Functional Mobility: Contact guard assistance          Balance:      Balance  Sitting: Intact  Standing: Impaired  Standing - Static: Fair  Standing - Dynamic: Fair      ADL Assessment:          Feeding: Independent       Grooming: Stand by assistance  Grooming Skilled Clinical Factors: standing at sink    UE Bathing: Stand by assistance;Based on clinical judgement       Product Used : Chlorhexidine wipes    LE Bathing: Stand by assistance;Based on clinical judgement       UE Dressing: Setup       LE Dressing: Stand by assistance       Toileting: Contact guard assistance            Functional Mobility: Contact guard assistance            Pain Rating:  No c/o of pain    Activity Tolerance:   Good    After treatment:   Patient left in no apparent distress sitting up in chair, Call bell within reach, Bed/ chair alarm activated, and Caregiver / family present    COMMUNICATION/EDUCATION:   The patient's plan of care was discussed with: registered nurse    Patient Education  Education Given To: Patient;Family  Education Provided: Role of Therapy;Plan of Care;Transfer Training  Education Method: Demonstration;Verbal  Barriers to Learning: None  Education Outcome: Verbalized understanding;Demonstrated 
PT  Minutes: 29      Physical Therapy Evaluation Charge Determination   History Examination Presentation Decision-Making   HIGH Complexity :3+ comorbidities / personal factors will impact the outcome/ POC  MEDIUM Complexity : 3 Standardized tests and measures addressin body structure, function, activity limitation and / or participation in recreation  LOW Complexity : Stable, uncomplicated  AM-PAC  LOW    Based on the above components, the patient evaluation is determined to be of the following complexity level: Low

## 2025-01-02 ENCOUNTER — TELEPHONE (OUTPATIENT)
Age: 80
End: 2025-01-02

## 2025-01-02 NOTE — TELEPHONE ENCOUNTER
Patient daughter, Page, is calling to schedule a neuropsych evaluation. Referral on file by Dr. Marte. Please call her back at . She states this is urgent because they're trying to put her in a memory care unit.

## 2025-01-08 ENCOUNTER — TELEMEDICINE (OUTPATIENT)
Age: 80
End: 2025-01-08

## 2025-01-08 DIAGNOSIS — R41.0 CONFUSION: ICD-10-CM

## 2025-01-08 DIAGNOSIS — R41.3 MEMORY DIFFICULTIES: Primary | ICD-10-CM

## 2025-01-08 NOTE — PROGRESS NOTES
Intake Note      Patient Name: Anastacia Oliver  YOB: 1945    Age: 79 y.o.  Date of Intake: 1/8/2025   Education: 12 Ethnicity White   Gender: Female Referring Provider: Dr. Marte     REASON FOR REFERRAL AND EVALUATION PROCEDURES:  Anastacia Oliver  was referred for evaluation by her Neurologist to assist in differential diagnosis and individualized treatment planning. she understood the rationale and procedures for evaluation, as well as the limits to confidentiality, and agreed to participate. she consented to have this report made available to her  treating providers through her  electronic medical records.   History Sources: Patient, Spouse, Relative (daughter), and Medical Record    HISTORY OF PRESENT ILLNESS:  The patient is an 79-year-old female with pertinent medical history noted for hypertension, urinary tract infection, acute metabolic encephalopathy, and memory impairment.  She presented for clinical interview accompanied by her  and daughter.  The patient's insight and ability as a historian were compromised and history was primarily obtained through collateral interview.  According to the patient's  and daughter, the patient demonstrated the insidious onset of gradually progressive declines in her cognitive functioning over the past 9 to 12 months.  They describe the changes to include short-term episodic memory loss, demonstrated by the patient forgetting conversations after 5 to 10 minutes and not benefiting from cueing to prompt her retrieval.  They also reported there are considerable attention deficits, illustrated by the patient losing her train of thought during conversations and easily misplacing frequently use personal belongings.  The patient's daughter and  have not noticed any acute fluctuations in the patient's mental status but they report the patient's cognitive impairment is worse in the evenings.    Pertaining to the patient's neuropsychiatric

## 2025-01-14 ENCOUNTER — PROCEDURE VISIT (OUTPATIENT)
Age: 80
End: 2025-01-14
Payer: MEDICARE

## 2025-01-14 DIAGNOSIS — G93.40 ACUTE ENCEPHALOPATHY: ICD-10-CM

## 2025-01-14 DIAGNOSIS — F03.90 DEMENTIA, UNSPECIFIED DEMENTIA SEVERITY, UNSPECIFIED DEMENTIA TYPE, UNSPECIFIED WHETHER BEHAVIORAL, PSYCHOTIC, OR MOOD DISTURBANCE OR ANXIETY (HCC): Primary | ICD-10-CM

## 2025-01-14 PROCEDURE — 96138 PSYCL/NRPSYC TECH 1ST: CPT | Performed by: CLINICAL NEUROPSYCHOLOGIST

## 2025-01-14 PROCEDURE — 96132 NRPSYC TST EVAL PHYS/QHP 1ST: CPT | Performed by: CLINICAL NEUROPSYCHOLOGIST

## 2025-01-14 PROCEDURE — 96139 PSYCL/NRPSYC TST TECH EA: CPT | Performed by: CLINICAL NEUROPSYCHOLOGIST

## 2025-01-14 PROCEDURE — 96133 NRPSYC TST EVAL PHYS/QHP EA: CPT | Performed by: CLINICAL NEUROPSYCHOLOGIST

## 2025-01-28 PROBLEM — N39.0 UTI (URINARY TRACT INFECTION): Status: RESOLVED | Noted: 2024-12-29 | Resolved: 2025-01-28

## 2025-01-29 ENCOUNTER — TELEPHONE (OUTPATIENT)
Dept: ADMINISTRATIVE | Facility: CLINIC | Age: 80
End: 2025-01-29

## 2025-01-29 NOTE — TELEPHONE ENCOUNTER
Received a call from daughter stating that your office wanted to move patient's appt on 1/31 from 4 pm to 3pm.  Appointment rescheduled.

## 2025-01-31 ENCOUNTER — OFFICE VISIT (OUTPATIENT)
Age: 80
End: 2025-01-31
Payer: MEDICARE

## 2025-01-31 DIAGNOSIS — F03.90 DEMENTIA, UNSPECIFIED DEMENTIA SEVERITY, UNSPECIFIED DEMENTIA TYPE, UNSPECIFIED WHETHER BEHAVIORAL, PSYCHOTIC, OR MOOD DISTURBANCE OR ANXIETY (HCC): ICD-10-CM

## 2025-01-31 DIAGNOSIS — G93.40 ACUTE ENCEPHALOPATHY: Primary | ICD-10-CM

## 2025-01-31 PROCEDURE — 96132 NRPSYC TST EVAL PHYS/QHP 1ST: CPT | Performed by: CLINICAL NEUROPSYCHOLOGIST

## 2025-01-31 NOTE — PROGRESS NOTES
Neuropsychological Evaluation Report      Patient Name: Anastacia Oliver  YOB: 1945    Age: 79 y.o.  Date of Intake: 2025   Education: 12 Date of Testin2025   Gender: Female Ethnicity: White     Referring Provider: Dr. Marte     REASON FOR REFERRAL AND EVALUATION PROCEDURES:  Anastacia Oliver  was referred for neuropsychological evaluation by her Neurologist to obtain a quantitative assessment of her current level of neurocognitive functioning, assist in differential diagnosis, and aid in individualized treatment planning. The patient understood the rationale and procedures for evaluation, as well as the limits to confidentiality, and they agreed to participate. The patient consented to have this report made available to her  treating providers through her  electronic medical records. This evaluation was completed with the patient by Isac Garcia PsyD with the exception of testing by technician, which was completed by CHENTE Dillard under the supervision of Dr. Garcia.  History Sources: Patient, Spouse, Relative (daughter), Medical Record, and Test Data    SUMMARY AND IMPRESSION:  The following section is a summary of the patient's pertinent test results and impressions. A more thorough review of the patient's background and test scores can be found below.    The patient's cognitive functioning across domains of mental processing speed, executive functioning, expressive language, visual-spatial perception/construction, learning and recall were in the range of moderate to profound impairment.  Auditory attention and working memory were average and low average respectively and one test of recognition (story recognition) was preserved while other recognition testing was impaired.  Performances on tests simulating practical aspects of daily living skills were impaired.  Judgment was compromised.  Brief assessment of psychopathology revealed mild symptoms of anxiety.    Moderate to profound

## 2025-01-31 NOTE — PROGRESS NOTES
Chief complaint: Patient presented for review of previously completed neuropsychological evaluation and discussion of impressions/recommendations.    Prior to seeing the patient for today's visit, I reviewed pertinent records, including the previously completed report, the records in Epic, and any updated visits from other providers since the patient's last visit.    I provided feedback services related to the previously completed report. Attendees included: Patient, Spouse, and Children. Education was provided regarding my diagnostic impressions, and we discussed treatment plan/options. Attendees were provided with the opportunity to ask questions, which were answered to the best of my ability.    We discussed, in detail, the following:  Reviewed findings from evaluation including test results, diagnosis, and suspected contributing factors  Discussed recommendations outlined in report  Answered questions to the best of my ability    The patient needs to:   Follow up with referring provider for ongoing management, Use practical strategies to compensate for cognitive weaknesses (See handout), Emphasize modifiable risk and protective factors for cognitive functioning (e.g., exercise, diet, cognitive stimulation; see handout), and Access community resources we discussed for additional support (See handout)    The patient had the following reactions to recommendations: Patient and family reported understanding results and recommendations.  They indicated they would follow back up with neurology for further workup.    ASSESSMENT:  Acute encephalopathy  Unspecified dementia    TIME SPENT PROVIDING SERVICES:   31 minutes     BILLING:  96132 x 1 Units    *Code 82425 Neuropsychological testing evaluation services include: Integration of patient data, interpretation of standardized test results and clinical data, clinical decision-making, treatment planning and report, and interactive feedback to the patient, family member(s)

## 2025-02-03 ENCOUNTER — TELEPHONE (OUTPATIENT)
Age: 80
End: 2025-02-03

## 2025-02-05 NOTE — TELEPHONE ENCOUNTER
Reached back out and spoke with the patients daughter and scheduled the patient for April 23rd with Dr. Marte. Patients daughter requesting a sooner appointment and stated Dr. Garcia wanted some testing done. Patient placed on the cancellation list.

## 2025-02-24 ENCOUNTER — TELEPHONE (OUTPATIENT)
Age: 80
End: 2025-02-24

## 2025-02-25 NOTE — TELEPHONE ENCOUNTER
LVM that appt was scheduled with NASIM Castañeda on 3/21/25 arrival 1015 per Dr. Marte based off of the neurocog report:    Moderate to profound impairment across almost all cognitive domains is not reflective of localized dysfunction and it is not specific to any etiology.  This level of diffuse impairment is also highly unusual for neurodegenerative conditions after only 9 to 12 months of decline.  The patient's neuropsychiatric presentation (i.e., hallucinations and delusions) would also be unusual for most neurodegenerative conditions this early in the course.  While these neuropsychiatric symptoms can occur early in atypical parkinsonian syndromes, neurologic exam has not revealed evidence of parkinsonism, so at this point, it would not be high on the differential.  While a neurodegenerative condition is likely occurring, I suspect there are other factors causing the nearly global impairment that may also be producing her neuropsychiatric symptoms.  I would recommend evaluation for causes of transient encephalopathy and/or rapidly progressive dementia (e.g., metabolic, nutritional, toxic, autoimmune, paraneoplastic, etc.). .

## 2025-03-12 ENCOUNTER — OFFICE VISIT (OUTPATIENT)
Age: 80
End: 2025-03-12
Payer: MEDICARE

## 2025-03-12 VITALS
RESPIRATION RATE: 20 BRPM | OXYGEN SATURATION: 96 % | HEART RATE: 76 BPM | DIASTOLIC BLOOD PRESSURE: 74 MMHG | SYSTOLIC BLOOD PRESSURE: 136 MMHG

## 2025-03-12 DIAGNOSIS — R41.82 ALTERED MENTAL STATUS, UNSPECIFIED ALTERED MENTAL STATUS TYPE: ICD-10-CM

## 2025-03-12 DIAGNOSIS — R41.0 CONFUSION: ICD-10-CM

## 2025-03-12 DIAGNOSIS — F03.90 DEMENTIA, UNSPECIFIED DEMENTIA SEVERITY, UNSPECIFIED DEMENTIA TYPE, UNSPECIFIED WHETHER BEHAVIORAL, PSYCHOTIC, OR MOOD DISTURBANCE OR ANXIETY (HCC): Primary | ICD-10-CM

## 2025-03-12 DIAGNOSIS — G93.40 ACUTE ENCEPHALOPATHY: ICD-10-CM

## 2025-03-12 PROCEDURE — G8427 DOCREV CUR MEDS BY ELIG CLIN: HCPCS

## 2025-03-12 PROCEDURE — 1036F TOBACCO NON-USER: CPT

## 2025-03-12 PROCEDURE — 1160F RVW MEDS BY RX/DR IN RCRD: CPT

## 2025-03-12 PROCEDURE — 1090F PRES/ABSN URINE INCON ASSESS: CPT

## 2025-03-12 PROCEDURE — 99215 OFFICE O/P EST HI 40 MIN: CPT

## 2025-03-12 PROCEDURE — 1123F ACP DISCUSS/DSCN MKR DOCD: CPT

## 2025-03-12 PROCEDURE — 1126F AMNT PAIN NOTED NONE PRSNT: CPT

## 2025-03-12 PROCEDURE — 1159F MED LIST DOCD IN RCRD: CPT

## 2025-03-12 PROCEDURE — 3078F DIAST BP <80 MM HG: CPT

## 2025-03-12 PROCEDURE — 3075F SYST BP GE 130 - 139MM HG: CPT

## 2025-03-12 PROCEDURE — G8399 PT W/DXA RESULTS DOCUMENT: HCPCS

## 2025-03-12 PROCEDURE — G8419 CALC BMI OUT NRM PARAM NOF/U: HCPCS

## 2025-03-12 RX ORDER — RISPERIDONE 0.5 MG/1
TABLET ORAL
Qty: 60 TABLET | Refills: 3 | Status: SHIPPED | OUTPATIENT
Start: 2025-03-12

## 2025-03-12 NOTE — PROGRESS NOTES
individual in no acute distress.    Neck: Supple, nontender, no bruits, no pain with resistance to active range of motion.    Musculoskeletal: Extremities revealed no edema and had full range of motion of joints.    Psych: Anxious asking to leave several times.     NEUROLOGICAL EXAMINATION:    Mental Status: Alert and oriented to person, place  Cranial Nerves:    II, III, IV, VI: Visual acuity grossly intact. Visual fields are normal.    Pupils are equal, round, and reactive to light and accommodation.    Extra-ocular movements are full and fluid.   V-XII: Hearing is grossly intact. Facial features are symmetric, with normal sensation and strength. The palate rises symmetrically and the tongue protrudes midline.     Motor Examination: Normal tone, bulk, and strength, 5/5 muscle strength throughout.     Coordination: No resting or intention tremor    Gait and Station: Steady while walking. Normal arm swing. No pronator drift. No muscle wasting or fasiculations noted.     Orders Placed This Encounter    Meningitis Encephalitis Panel CSF, Molecular     Standing Status:   Future     Expected Date:   3/12/2025     Expiration Date:   9/12/2025    VDRL, CSF     Standing Status:   Future     Expected Date:   3/12/2025     Expiration Date:   9/12/2025    Culture, CSF (with Gram Stain)     Standing Status:   Future     Expected Date:   3/12/2025     Expiration Date:   9/12/2025    PET BRAIN AMYLOID IMAGING     Standing Status:   Future     Expected Date:   3/12/2025     Expiration Date:   9/12/2025     Scheduling Instructions:      Please call Page Liu at [phone] to schedule.     Reason for exam::   Rule in/out Alzheimers as the type of dementia    FL LUMBAR PUNCTURE DIAG     Standing Status:   Future     Expected Date:   3/12/2025     Expiration Date:   9/12/2025     Reason for exam::   Rapidly progressing confusion, memory loss    NMDA Receptor,CSF     Standing Status:   Future     Expected Date:   3/12/2025

## 2025-03-18 ENCOUNTER — TELEPHONE (OUTPATIENT)
Age: 80
End: 2025-03-18

## 2025-03-18 NOTE — TELEPHONE ENCOUNTER
Pilar called in from [facility] prior authorization department stating that the PA was put in for the patient to have a PET scan on  3/28/2025. The insurance is requesting additional information. Insurance is asking is there a plan for anti-amyloid therapy or treatment and if there is a diagnosis of mild cognitive impairment. Insurance company phone number [phone] tracking number: [id number]

## 2025-03-24 ENCOUNTER — TELEPHONE (OUTPATIENT)
Age: 80
End: 2025-03-24

## 2025-03-24 NOTE — TELEPHONE ENCOUNTER
We do not prescribe Xeralto for pt.  According to pcp notes that were scanned in to chart on 11/8/24, pt is on xeralto d/t DVTs which would be managed by pcp or hematology.    Returned pt's daughter's call.  Discussed this with her.  She will call Dr. Mcguire's office back to discuss.    Upon reading LOV note, Carlita is the ordering provider so she may need to fill out neuro clearance form

## 2025-03-28 ENCOUNTER — HOSPITAL ENCOUNTER (OUTPATIENT)
Facility: HOSPITAL | Age: 80
Discharge: HOME | End: 2025-03-31
Payer: MEDICARE

## 2025-03-28 DIAGNOSIS — F03.90 DEMENTIA, UNSPECIFIED DEMENTIA SEVERITY, UNSPECIFIED DEMENTIA TYPE, UNSPECIFIED WHETHER BEHAVIORAL, PSYCHOTIC, OR MOOD DISTURBANCE OR ANXIETY (HCC): ICD-10-CM

## 2025-03-28 PROCEDURE — A9586 FLORBETAPIR F18: HCPCS

## 2025-03-28 PROCEDURE — 78814 PET IMAGE W/CT LMTD: CPT

## 2025-03-28 PROCEDURE — 6360000002 HC RX W HCPCS

## 2025-03-28 RX ADMIN — FLORBETAPIR F 18 10 MILLICURIE: 51 INJECTION, SOLUTION INTRAVENOUS at 13:50

## 2025-03-31 ENCOUNTER — HOSPITAL ENCOUNTER (OUTPATIENT)
Facility: HOSPITAL | Age: 80
Discharge: HOME | End: 2025-04-03
Payer: MEDICARE

## 2025-03-31 VITALS
OXYGEN SATURATION: 97 % | RESPIRATION RATE: 19 BRPM | SYSTOLIC BLOOD PRESSURE: 163 MMHG | DIASTOLIC BLOOD PRESSURE: 75 MMHG | HEART RATE: 77 BPM

## 2025-03-31 DIAGNOSIS — G93.40 ACUTE ENCEPHALOPATHY: ICD-10-CM

## 2025-03-31 DIAGNOSIS — R41.0 CONFUSION: ICD-10-CM

## 2025-03-31 DIAGNOSIS — R41.82 ALTERED MENTAL STATUS, UNSPECIFIED ALTERED MENTAL STATUS TYPE: ICD-10-CM

## 2025-03-31 LAB
APPEARANCE CSF: CLEAR
C GATTII+NEOFOR DNA CSF QL NAA+NON-PROBE: NOT DETECTED
CMV DNA CSF QL NAA+NON-PROBE: NOT DETECTED
COLOR CSF: COLORLESS
E COLI K1 DNA CSF QL NAA+NON-PROBE: NOT DETECTED
EV RNA CSF QL NAA+NON-PROBE: NOT DETECTED
GLUCOSE CSF-MCNC: 53 MG/DL (ref 40–70)
GP B STREP DNA CSF QL NAA+NON-PROBE: NOT DETECTED
HAEM INFLU DNA CSF QL NAA+NON-PROBE: NOT DETECTED
HHV6 DNA CSF QL NAA+NON-PROBE: NOT DETECTED
HSV1 DNA CSF QL NAA+PROBE: NOT DETECTED
HSV2 DNA CSF QL NAA+NON-PROBE: NOT DETECTED
L MONOCYTOG DNA CSF QL NAA+NON-PROBE: NOT DETECTED
N MEN DNA CSF QL NAA+NON-PROBE: NOT DETECTED
PARECHOVIRUS A RNA CSF QL NAA+NON-PROBE: NOT DETECTED
PROT CSF-MCNC: 60 MG/DL (ref 15–45)
RBC # CSF: 102 /CU MM
S PNEUM DNA CSF QL NAA+NON-PROBE: NOT DETECTED
TUBE # CSF: ABNORMAL
TUBE # CSF: ABNORMAL
TUBE # CSF: NORMAL
VZV DNA CSF QL NAA+NON-PROBE: NOT DETECTED
WBC # CSF: 1 /CU MM (ref 0–5)

## 2025-03-31 PROCEDURE — 6360000002 HC RX W HCPCS: Performed by: RADIOLOGY

## 2025-03-31 PROCEDURE — 87205 SMEAR GRAM STAIN: CPT

## 2025-03-31 PROCEDURE — 87483 CNS DNA AMP PROBE TYPE 12-25: CPT

## 2025-03-31 PROCEDURE — 86612 BLASTOMYCES ANTIBODY: CPT

## 2025-03-31 PROCEDURE — 86698 HISTOPLASMA ANTIBODY: CPT

## 2025-03-31 PROCEDURE — 86635 COCCIDIOIDES ANTIBODY: CPT

## 2025-03-31 PROCEDURE — 87070 CULTURE OTHR SPECIMN AEROBIC: CPT

## 2025-03-31 PROCEDURE — 86592 SYPHILIS TEST NON-TREP QUAL: CPT

## 2025-03-31 PROCEDURE — 86606 ASPERGILLUS ANTIBODY: CPT

## 2025-03-31 PROCEDURE — 86255 FLUORESCENT ANTIBODY SCREEN: CPT

## 2025-03-31 PROCEDURE — 84157 ASSAY OF PROTEIN OTHER: CPT

## 2025-03-31 PROCEDURE — 82945 GLUCOSE OTHER FLUID: CPT

## 2025-03-31 PROCEDURE — 62328 DX LMBR SPI PNXR W/FLUOR/CT: CPT

## 2025-03-31 PROCEDURE — 89050 BODY FLUID CELL COUNT: CPT

## 2025-03-31 RX ORDER — LIDOCAINE HYDROCHLORIDE 10 MG/ML
5 INJECTION, SOLUTION EPIDURAL; INFILTRATION; INTRACAUDAL; PERINEURAL ONCE
Status: DISCONTINUED | OUTPATIENT
Start: 2025-03-31 | End: 2025-04-04 | Stop reason: HOSPADM

## 2025-03-31 RX ORDER — LIDOCAINE HYDROCHLORIDE 10 MG/ML
5 INJECTION, SOLUTION EPIDURAL; INFILTRATION; INTRACAUDAL; PERINEURAL ONCE
Status: COMPLETED | OUTPATIENT
Start: 2025-03-31 | End: 2025-03-31

## 2025-03-31 RX ADMIN — LIDOCAINE HYDROCHLORIDE 5 ML: 10 INJECTION, SOLUTION EPIDURAL; INFILTRATION; INTRACAUDAL; PERINEURAL at 12:27

## 2025-03-31 NOTE — DISCHARGE INSTRUCTIONS
still be there.  Many people feel better right away, but it could take a day or two. And a few people need to have a second blood patch.  Follow-up care is a key part of your treatment and safety. Be sure to make and go to all appointments, and call your doctor if you are having problems. It's also a good idea to know your test results and keep a list of the medicines you take.  Current as of: December 3, 2024  Content Version: 14.4  © 2024-2025 [employer].   Care instructions adapted under license by [employer]. If you have questions about a medical condition or this instruction, always ask your healthcare professional. [employer] disclaims any warranty or liability for your use of this information.

## 2025-03-31 NOTE — PROGRESS NOTES
1213- Pt brought to radiology holding post lumbar puncture supine on stretcher. Pt is A&O able to state name and birthday, what test she had done but unsure of date, with no C/O pain. Baseline VS taken.   1230- Pt supine eating shortbread cookies and drinking water with no complaints.   1250- Pt  Amado and her daughter were given discharge instructions. They were given opportunity ask questions and clarification was provided.   1255- Pt requested to use bathroom. Pt was walked to bathroom. Pt had no C/O pain and stated that she urinated. Pt has three band aids lower back dry and intact.   1305- Pt discharged to front [place] via wheelchair to care of her daughter and .

## 2025-04-01 LAB
BACTERIA SPEC CULT: NORMAL
GRAM STN SPEC: NORMAL
SERVICE CMNT-IMP: NORMAL

## 2025-04-02 LAB — REAGIN AB CSF QL: NON REACTIVE

## 2025-04-03 LAB — NMDAR ANTIBODY, SPINAL FLUID: NEGATIVE

## 2025-04-06 LAB
ASPERGILLUS ANTIBODY ID: NORMAL
BLASTOMYCES ABS, QN, DID: NORMAL
HISTOPLASMA MYCELIAL: NORMAL
HISTOPLASMA YEAST: NORMAL

## 2025-04-07 LAB
BACTERIA SPEC CULT: NORMAL
GRAM STN SPEC: NORMAL
SERVICE CMNT-IMP: NORMAL

## 2025-04-23 ENCOUNTER — OFFICE VISIT (OUTPATIENT)
Age: 80
End: 2025-04-23
Payer: MEDICARE

## 2025-04-23 VITALS
HEART RATE: 79 BPM | OXYGEN SATURATION: 96 % | RESPIRATION RATE: 16 BRPM | WEIGHT: 134 LBS | DIASTOLIC BLOOD PRESSURE: 69 MMHG | BODY MASS INDEX: 25.32 KG/M2 | SYSTOLIC BLOOD PRESSURE: 124 MMHG

## 2025-04-23 DIAGNOSIS — G30.1 MODERATE LATE ONSET ALZHEIMER'S DEMENTIA WITH AGITATION (HCC): Primary | ICD-10-CM

## 2025-04-23 DIAGNOSIS — F02.B11 MODERATE LATE ONSET ALZHEIMER'S DEMENTIA WITH AGITATION (HCC): Primary | ICD-10-CM

## 2025-04-23 PROCEDURE — G8419 CALC BMI OUT NRM PARAM NOF/U: HCPCS | Performed by: PSYCHIATRY & NEUROLOGY

## 2025-04-23 PROCEDURE — 1090F PRES/ABSN URINE INCON ASSESS: CPT | Performed by: PSYCHIATRY & NEUROLOGY

## 2025-04-23 PROCEDURE — 1126F AMNT PAIN NOTED NONE PRSNT: CPT | Performed by: PSYCHIATRY & NEUROLOGY

## 2025-04-23 PROCEDURE — 1159F MED LIST DOCD IN RCRD: CPT | Performed by: PSYCHIATRY & NEUROLOGY

## 2025-04-23 PROCEDURE — 99214 OFFICE O/P EST MOD 30 MIN: CPT | Performed by: PSYCHIATRY & NEUROLOGY

## 2025-04-23 PROCEDURE — 3074F SYST BP LT 130 MM HG: CPT | Performed by: PSYCHIATRY & NEUROLOGY

## 2025-04-23 PROCEDURE — 1036F TOBACCO NON-USER: CPT | Performed by: PSYCHIATRY & NEUROLOGY

## 2025-04-23 PROCEDURE — G8427 DOCREV CUR MEDS BY ELIG CLIN: HCPCS | Performed by: PSYCHIATRY & NEUROLOGY

## 2025-04-23 PROCEDURE — 3078F DIAST BP <80 MM HG: CPT | Performed by: PSYCHIATRY & NEUROLOGY

## 2025-04-23 PROCEDURE — G8399 PT W/DXA RESULTS DOCUMENT: HCPCS | Performed by: PSYCHIATRY & NEUROLOGY

## 2025-04-23 PROCEDURE — 1123F ACP DISCUSS/DSCN MKR DOCD: CPT | Performed by: PSYCHIATRY & NEUROLOGY

## 2025-04-23 RX ORDER — RISPERIDONE 0.5 MG/1
TABLET ORAL
Qty: 150 TABLET | Refills: 5 | Status: SHIPPED | OUTPATIENT
Start: 2025-04-23

## 2025-04-23 NOTE — PROGRESS NOTES
[facility] at [facility] at [address] [place] [address] [address] [place] [address] [phone]              Chief Complaint   Patient presents with    Memory Loss     LOV 3/2025 w/ Carlita// Results - Dr Jose LP, + PET // Here w/ daughter // daughter noter worsening movements- stiffness, getting up and down     Current Outpatient Medications   Medication Sig Dispense Refill    risperiDONE (RISPERDAL) 0.5 MG tablet Take 1-2 tablets twice a day 60 tablet 3    melatonin 3 MG TABS tablet Take 2 tablets by mouth nightly as needed (sleep) (Patient taking differently: Take 10 mg by mouth at bedtime) 30 tablet 0    albuterol sulfate HFA (PROVENTIL;VENTOLIN;PROAIR) 108 (90 Base) MCG/ACT inhaler Inhale 2 puffs into the lungs every 6 hours as needed      TRELEGY ELLIPTA 100-62.5-25 MCG/ACT AEPB inhaler Inhale 1 puff into the lungs daily      hydrocortisone (CORTEF) 10 MG tablet Take 1 tablet by mouth 2 times daily      metoprolol tartrate (LOPRESSOR) 50 MG tablet Take 1 tablet by mouth 2 times daily      XARELTO 20 MG TABS tablet Take 1 tablet by mouth Daily with supper       No current facility-administered medications for this visit.      Allergies   Allergen Reactions    Lorazepam Other (See Comments)     Severe agitation    Tramadol Other (See Comments)     confusion    Penicillins Rash     Social History     Tobacco Use    Smoking status: Never    Smokeless tobacco: Never   Vaping Use    Vaping status: Never Used   Substance Use Topics    Alcohol use: Not Currently    Drug use: Never       History of Present Illness  80-year-old lady following up today with her daughter for progressive memory difficulty.  She is with her daughter today.  She has had increasing agitation.  The Risperdal has helped with that.  She has sundowning.  She has gotten to the point where the family

## 2025-05-29 ENCOUNTER — APPOINTMENT (OUTPATIENT)
Facility: HOSPITAL | Age: 80
End: 2025-05-29
Payer: MEDICARE

## 2025-05-29 ENCOUNTER — HOSPITAL ENCOUNTER (EMERGENCY)
Facility: HOSPITAL | Age: 80
Discharge: HOME | End: 2025-05-29
Attending: EMERGENCY MEDICINE
Payer: MEDICARE

## 2025-05-29 VITALS
HEART RATE: 76 BPM | SYSTOLIC BLOOD PRESSURE: 181 MMHG | OXYGEN SATURATION: 97 % | HEIGHT: 61 IN | WEIGHT: 141.6 LBS | BODY MASS INDEX: 26.73 KG/M2 | TEMPERATURE: 98.3 F | DIASTOLIC BLOOD PRESSURE: 88 MMHG | RESPIRATION RATE: 18 BRPM

## 2025-05-29 DIAGNOSIS — E86.0 DEHYDRATION: ICD-10-CM

## 2025-05-29 DIAGNOSIS — F02.80 ALZHEIMER'S DEMENTIA, UNSPECIFIED DEMENTIA SEVERITY, UNSPECIFIED TIMING OF DEMENTIA ONSET, UNSPECIFIED WHETHER BEHAVIORAL, PSYCHOTIC, OR MOOD DISTURBANCE OR ANXIETY (HCC): ICD-10-CM

## 2025-05-29 DIAGNOSIS — R29.6 UNWITNESSED FALL: Primary | ICD-10-CM

## 2025-05-29 DIAGNOSIS — G30.9 ALZHEIMER'S DEMENTIA, UNSPECIFIED DEMENTIA SEVERITY, UNSPECIFIED TIMING OF DEMENTIA ONSET, UNSPECIFIED WHETHER BEHAVIORAL, PSYCHOTIC, OR MOOD DISTURBANCE OR ANXIETY (HCC): ICD-10-CM

## 2025-05-29 LAB
ALBUMIN SERPL-MCNC: 3.3 G/DL (ref 3.5–5)
ALBUMIN/GLOB SERPL: 0.9 (ref 1.1–2.2)
ALP SERPL-CCNC: 55 U/L (ref 45–117)
ALT SERPL-CCNC: 13 U/L (ref 12–78)
ANION GAP SERPL CALC-SCNC: 2 MMOL/L (ref 2–12)
APPEARANCE UR: CLEAR
AST SERPL-CCNC: 21 U/L (ref 15–37)
BACTERIA URNS QL MICRO: NEGATIVE /HPF
BASOPHILS # BLD: 0.01 K/UL (ref 0–0.1)
BASOPHILS NFR BLD: 0.2 % (ref 0–1)
BILIRUB SERPL-MCNC: 0.5 MG/DL (ref 0.2–1)
BILIRUB UR QL: NEGATIVE
BUN SERPL-MCNC: 22 MG/DL (ref 6–20)
BUN/CREAT SERPL: 19 (ref 12–20)
CALCIUM SERPL-MCNC: 9 MG/DL (ref 8.5–10.1)
CAOX CRY URNS QL MICRO: ABNORMAL
CHLORIDE SERPL-SCNC: 108 MMOL/L (ref 97–108)
CO2 SERPL-SCNC: 27 MMOL/L (ref 21–32)
COLOR UR: ABNORMAL
COMMENT:: NORMAL
CREAT SERPL-MCNC: 1.14 MG/DL (ref 0.55–1.02)
DIFFERENTIAL METHOD BLD: ABNORMAL
EOSINOPHIL # BLD: 0 K/UL (ref 0–0.4)
EOSINOPHIL NFR BLD: 0 % (ref 0–7)
EPITH CASTS URNS QL MICRO: ABNORMAL /LPF
ERYTHROCYTE [DISTWIDTH] IN BLOOD BY AUTOMATED COUNT: 13.4 % (ref 11.5–14.5)
GLOBULIN SER CALC-MCNC: 3.7 G/DL (ref 2–4)
GLUCOSE SERPL-MCNC: 118 MG/DL (ref 65–100)
GLUCOSE UR STRIP.AUTO-MCNC: NEGATIVE MG/DL
HCT VFR BLD AUTO: 39.9 % (ref 35–47)
HGB BLD-MCNC: 13 G/DL (ref 11.5–16)
HGB UR QL STRIP: NEGATIVE
HYALINE CASTS URNS QL MICRO: ABNORMAL /LPF (ref 0–5)
IMM GRANULOCYTES # BLD AUTO: 0.04 K/UL (ref 0–0.04)
IMM GRANULOCYTES NFR BLD AUTO: 0.6 % (ref 0–0.5)
KETONES UR QL STRIP.AUTO: ABNORMAL MG/DL
LEUKOCYTE ESTERASE UR QL STRIP.AUTO: NEGATIVE
LYMPHOCYTES # BLD: 1.1 K/UL (ref 0.8–3.5)
LYMPHOCYTES NFR BLD: 17 % (ref 12–49)
MCH RBC QN AUTO: 28.3 PG (ref 26–34)
MCHC RBC AUTO-ENTMCNC: 32.6 G/DL (ref 30–36.5)
MCV RBC AUTO: 86.9 FL (ref 80–99)
MONOCYTES # BLD: 0.87 K/UL (ref 0–1)
MONOCYTES NFR BLD: 13.4 % (ref 5–13)
NEUTS SEG # BLD: 4.46 K/UL (ref 1.8–8)
NEUTS SEG NFR BLD: 68.8 % (ref 32–75)
NITRITE UR QL STRIP.AUTO: NEGATIVE
NRBC # BLD: 0 K/UL (ref 0–0.01)
NRBC BLD-RTO: 0 PER 100 WBC
PH UR STRIP: 5.5 (ref 5–8)
PLATELET # BLD AUTO: 165 K/UL (ref 150–400)
PMV BLD AUTO: 9.3 FL (ref 8.9–12.9)
POTASSIUM SERPL-SCNC: 4 MMOL/L (ref 3.5–5.1)
PROT SERPL-MCNC: 7 G/DL (ref 6.4–8.2)
PROT UR STRIP-MCNC: ABNORMAL MG/DL
RBC # BLD AUTO: 4.59 M/UL (ref 3.8–5.2)
RBC #/AREA URNS HPF: ABNORMAL /HPF (ref 0–5)
SODIUM SERPL-SCNC: 137 MMOL/L (ref 136–145)
SP GR UR REFRACTOMETRY: 1.02 (ref 1–1.03)
SPECIMEN HOLD: NORMAL
URINE CULTURE IF INDICATED: ABNORMAL
UROBILINOGEN UR QL STRIP.AUTO: 1 EU/DL (ref 0.2–1)
WBC # BLD AUTO: 6.5 K/UL (ref 3.6–11)
WBC URNS QL MICRO: ABNORMAL /HPF (ref 0–4)

## 2025-05-29 PROCEDURE — 71045 X-RAY EXAM CHEST 1 VIEW: CPT

## 2025-05-29 PROCEDURE — 96361 HYDRATE IV INFUSION ADD-ON: CPT

## 2025-05-29 PROCEDURE — 2580000003 HC RX 258: Performed by: EMERGENCY MEDICINE

## 2025-05-29 PROCEDURE — 72170 X-RAY EXAM OF PELVIS: CPT

## 2025-05-29 PROCEDURE — 36415 COLL VENOUS BLD VENIPUNCTURE: CPT

## 2025-05-29 PROCEDURE — 85025 COMPLETE CBC W/AUTO DIFF WBC: CPT

## 2025-05-29 PROCEDURE — 80053 COMPREHEN METABOLIC PANEL: CPT

## 2025-05-29 PROCEDURE — 96360 HYDRATION IV INFUSION INIT: CPT

## 2025-05-29 PROCEDURE — 70450 CT HEAD/BRAIN W/O DYE: CPT

## 2025-05-29 PROCEDURE — 99284 EMERGENCY DEPT VISIT MOD MDM: CPT

## 2025-05-29 PROCEDURE — 81001 URINALYSIS AUTO W/SCOPE: CPT

## 2025-05-29 PROCEDURE — 72125 CT NECK SPINE W/O DYE: CPT

## 2025-05-29 RX ORDER — 0.9 % SODIUM CHLORIDE 0.9 %
500 INTRAVENOUS SOLUTION INTRAVENOUS ONCE
Status: COMPLETED | OUTPATIENT
Start: 2025-05-29 | End: 2025-05-29

## 2025-05-29 RX ADMIN — SODIUM CHLORIDE 500 ML: 0.9 INJECTION, SOLUTION INTRAVENOUS at 01:40

## 2025-05-29 RX ADMIN — SODIUM CHLORIDE 500 ML: 0.9 INJECTION, SOLUTION INTRAVENOUS at 03:28

## 2025-05-29 NOTE — ED TRIAGE NOTES
Pt via [facility] from [facility] for c/o \"shaking\", unwitnessed GLF, and increased weakness. Per EMS, NH staff states pt is more weak than usual and does not usually shake.  Upon arrival pt is awake, alert and oriented to self and place.  Pt currently denies any pain. C-Collar placed by EMS due to unwitnessed fall with blood thinners.

## 2025-05-29 NOTE — ED PROVIDER NOTES
[facility] EMERGENCY DEPARTMENT  EMERGENCY DEPARTMENT ENCOUNTER      Pt Name: Anastacia Oliver  MRN: [id number]  Birthdate 1945  Date of evaluation: 5/29/2025  Provider: Cosme Mckeon MD    CHIEF COMPLAINT       Chief Complaint   Patient presents with    Fall    Fatigue       EMERGENCY DEPARTMENT COURSE and DIFFERENTIAL DIAGNOSIS/MDM:   Medical Decision Making  80-year-old female brought into the ER by EMS after having an unwitnessed fall at [facility].  Patient has history of dementia/Alzheimer's.  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  Patient normally ambulates without assistance but does have a rollator.  Patient was found on the floor by staff.  No report of any specific pain prior to arrival.  No recent illnesses fevers or chills.  No change in her mental status other than being slightly more sleepy in the last day.  No report of vomiting or diarrhea.  No report of any wounds or lacerations.  EMS placed c-collar prior to arrival.  On arrival patient is awake alert and oriented to self only which is her baseline.  Sleeping but easily arousable.  On exam patient head atraumatic with no signs of any lacerations or hematomas.  C-collar in place however no neck pain or tenderness with normal range of motion c-collar removed.  No chest pain or abdominal pain to palpation or range of motion at upper or lower extremities without any pain or tenderness or deformities or swelling.  No neck thoracic or lumbar back pain to palpation.  Both patient's history of dementia had an unwitnessed fall will check labs obtain CT head C-spine chest x-ray and x-ray of the hips.  Rule out signs of infectious etiology or dehydration or electrolyte abnormalities.  Chest x-ray no evidence of pneumonia.  Urine with no signs of infection.  Patient does have mild increased serum creatinine from her baseline to Dry mucous membranes concern for dehydration given IV fluids.  Workup with no

## 2025-05-29 NOTE — ED NOTES
Patient tolerated ambulation test well. Dr. Mckeon notified daughter and daughter on the way for d/c.